# Patient Record
Sex: MALE | Race: WHITE | NOT HISPANIC OR LATINO
[De-identification: names, ages, dates, MRNs, and addresses within clinical notes are randomized per-mention and may not be internally consistent; named-entity substitution may affect disease eponyms.]

---

## 2018-10-16 ENCOUNTER — APPOINTMENT (OUTPATIENT)
Dept: SURGERY | Facility: CLINIC | Age: 48
End: 2018-10-16
Payer: COMMERCIAL

## 2018-10-16 VITALS
SYSTOLIC BLOOD PRESSURE: 110 MMHG | HEART RATE: 68 BPM | RESPIRATION RATE: 15 BRPM | HEIGHT: 70 IN | DIASTOLIC BLOOD PRESSURE: 69 MMHG | TEMPERATURE: 98.2 F | OXYGEN SATURATION: 97 % | WEIGHT: 200 LBS | BODY MASS INDEX: 28.63 KG/M2

## 2018-10-16 DIAGNOSIS — Z78.9 OTHER SPECIFIED HEALTH STATUS: ICD-10-CM

## 2018-10-16 DIAGNOSIS — Z80.1 FAMILY HISTORY OF MALIGNANT NEOPLASM OF TRACHEA, BRONCHUS AND LUNG: ICD-10-CM

## 2018-10-16 DIAGNOSIS — Z80.42 FAMILY HISTORY OF MALIGNANT NEOPLASM OF PROSTATE: ICD-10-CM

## 2018-10-16 DIAGNOSIS — Z80.0 FAMILY HISTORY OF MALIGNANT NEOPLASM OF DIGESTIVE ORGANS: ICD-10-CM

## 2018-10-16 DIAGNOSIS — Z80.8 FAMILY HISTORY OF MALIGNANT NEOPLASM OF OTHER ORGANS OR SYSTEMS: ICD-10-CM

## 2018-10-16 PROCEDURE — 99244 OFF/OP CNSLTJ NEW/EST MOD 40: CPT

## 2018-10-16 RX ORDER — FINASTERIDE 1 MG/1
1 TABLET ORAL
Refills: 0 | Status: ACTIVE | COMMUNITY

## 2018-10-18 ENCOUNTER — APPOINTMENT (OUTPATIENT)
Dept: SURGERY | Facility: CLINIC | Age: 48
End: 2018-10-18
Payer: COMMERCIAL

## 2018-10-18 VITALS
DIASTOLIC BLOOD PRESSURE: 69 MMHG | BODY MASS INDEX: 28.63 KG/M2 | HEIGHT: 70 IN | TEMPERATURE: 98.6 F | HEART RATE: 62 BPM | WEIGHT: 200 LBS | OXYGEN SATURATION: 98 % | SYSTOLIC BLOOD PRESSURE: 107 MMHG | RESPIRATION RATE: 15 BRPM

## 2018-10-18 PROCEDURE — 99215 OFFICE O/P EST HI 40 MIN: CPT

## 2018-10-18 RX ORDER — MULTIVITAMIN
TABLET ORAL
Refills: 0 | Status: ACTIVE | COMMUNITY

## 2018-10-18 RX ORDER — BIOTIN 10 MG
TABLET ORAL
Refills: 0 | Status: ACTIVE | COMMUNITY

## 2018-10-18 RX ORDER — OMEGA-3/DHA/EPA/FISH OIL 300-1000MG
CAPSULE ORAL
Refills: 0 | Status: ACTIVE | COMMUNITY

## 2018-10-18 RX ORDER — B-COMPLEX WITH VITAMIN C
TABLET ORAL
Refills: 0 | Status: ACTIVE | COMMUNITY

## 2018-10-18 RX ORDER — BETA-CAROTENE 10000 UNIT
CAPSULE ORAL
Refills: 0 | Status: ACTIVE | COMMUNITY

## 2018-10-20 ENCOUNTER — TRANSCRIPTION ENCOUNTER (OUTPATIENT)
Age: 48
End: 2018-10-20

## 2018-10-20 ENCOUNTER — FORM ENCOUNTER (OUTPATIENT)
Age: 48
End: 2018-10-20

## 2018-10-21 ENCOUNTER — APPOINTMENT (OUTPATIENT)
Dept: MRI IMAGING | Facility: CLINIC | Age: 48
End: 2018-10-21
Payer: COMMERCIAL

## 2018-10-21 ENCOUNTER — OUTPATIENT (OUTPATIENT)
Dept: OUTPATIENT SERVICES | Facility: HOSPITAL | Age: 48
LOS: 1 days | End: 2018-10-21
Payer: COMMERCIAL

## 2018-10-21 DIAGNOSIS — K80.20 CALCULUS OF GALLBLADDER WITHOUT CHOLECYSTITIS WITHOUT OBSTRUCTION: ICD-10-CM

## 2018-10-21 PROCEDURE — A9585: CPT

## 2018-10-21 PROCEDURE — 74183 MRI ABD W/O CNTR FLWD CNTR: CPT | Mod: 26

## 2018-10-21 PROCEDURE — 74183 MRI ABD W/O CNTR FLWD CNTR: CPT

## 2018-10-22 ENCOUNTER — OUTPATIENT (OUTPATIENT)
Dept: OUTPATIENT SERVICES | Facility: HOSPITAL | Age: 48
LOS: 1 days | End: 2018-10-22
Payer: COMMERCIAL

## 2018-10-22 VITALS
WEIGHT: 199.96 LBS | RESPIRATION RATE: 16 BRPM | TEMPERATURE: 99 F | HEIGHT: 70 IN | OXYGEN SATURATION: 98 % | HEART RATE: 66 BPM | DIASTOLIC BLOOD PRESSURE: 74 MMHG | SYSTOLIC BLOOD PRESSURE: 111 MMHG

## 2018-10-22 DIAGNOSIS — Z01.818 ENCOUNTER FOR OTHER PREPROCEDURAL EXAMINATION: ICD-10-CM

## 2018-10-22 DIAGNOSIS — Z98.890 OTHER SPECIFIED POSTPROCEDURAL STATES: Chronic | ICD-10-CM

## 2018-10-22 DIAGNOSIS — K80.20 CALCULUS OF GALLBLADDER WITHOUT CHOLECYSTITIS WITHOUT OBSTRUCTION: ICD-10-CM

## 2018-10-22 LAB
ALBUMIN SERPL ELPH-MCNC: 4.4 G/DL — SIGNIFICANT CHANGE UP (ref 3.3–5)
ALP SERPL-CCNC: 57 U/L — SIGNIFICANT CHANGE UP (ref 40–120)
ALT FLD-CCNC: 21 U/L — SIGNIFICANT CHANGE UP (ref 10–45)
ANION GAP SERPL CALC-SCNC: 9 MMOL/L — SIGNIFICANT CHANGE UP (ref 5–17)
AST SERPL-CCNC: 18 U/L — SIGNIFICANT CHANGE UP (ref 10–40)
BILIRUB SERPL-MCNC: 0.6 MG/DL — SIGNIFICANT CHANGE UP (ref 0.2–1.2)
BUN SERPL-MCNC: 9 MG/DL — SIGNIFICANT CHANGE UP (ref 7–23)
CALCIUM SERPL-MCNC: 9.1 MG/DL — SIGNIFICANT CHANGE UP (ref 8.4–10.5)
CHLORIDE SERPL-SCNC: 104 MMOL/L — SIGNIFICANT CHANGE UP (ref 96–108)
CO2 SERPL-SCNC: 28 MMOL/L — SIGNIFICANT CHANGE UP (ref 22–31)
CREAT SERPL-MCNC: 0.82 MG/DL — SIGNIFICANT CHANGE UP (ref 0.5–1.3)
GLUCOSE SERPL-MCNC: 113 MG/DL — HIGH (ref 70–99)
HCT VFR BLD CALC: 43.8 % — SIGNIFICANT CHANGE UP (ref 39–50)
HGB BLD-MCNC: 15.2 G/DL — SIGNIFICANT CHANGE UP (ref 13–17)
MCHC RBC-ENTMCNC: 31.1 PG — SIGNIFICANT CHANGE UP (ref 27–34)
MCHC RBC-ENTMCNC: 34.7 GM/DL — SIGNIFICANT CHANGE UP (ref 32–36)
MCV RBC AUTO: 89.8 FL — SIGNIFICANT CHANGE UP (ref 80–100)
PLATELET # BLD AUTO: 266 K/UL — SIGNIFICANT CHANGE UP (ref 150–400)
POTASSIUM SERPL-MCNC: 3.7 MMOL/L — SIGNIFICANT CHANGE UP (ref 3.5–5.3)
POTASSIUM SERPL-SCNC: 3.7 MMOL/L — SIGNIFICANT CHANGE UP (ref 3.5–5.3)
PROT SERPL-MCNC: 6.8 G/DL — SIGNIFICANT CHANGE UP (ref 6–8.3)
RBC # BLD: 4.88 M/UL — SIGNIFICANT CHANGE UP (ref 4.2–5.8)
RBC # FLD: 12.6 % — SIGNIFICANT CHANGE UP (ref 10.3–14.5)
SODIUM SERPL-SCNC: 141 MMOL/L — SIGNIFICANT CHANGE UP (ref 135–145)
WBC # BLD: 6.99 K/UL — SIGNIFICANT CHANGE UP (ref 3.8–10.5)
WBC # FLD AUTO: 6.99 K/UL — SIGNIFICANT CHANGE UP (ref 3.8–10.5)

## 2018-10-22 PROCEDURE — 85027 COMPLETE CBC AUTOMATED: CPT

## 2018-10-22 PROCEDURE — 80053 COMPREHEN METABOLIC PANEL: CPT

## 2018-10-22 PROCEDURE — G0463: CPT

## 2018-10-22 RX ORDER — VANCOMYCIN HCL 1 G
1500 VIAL (EA) INTRAVENOUS ONCE
Qty: 0 | Refills: 0 | Status: DISCONTINUED | OUTPATIENT
Start: 2018-10-24 | End: 2018-11-08

## 2018-10-22 RX ORDER — SODIUM CHLORIDE 9 MG/ML
3 INJECTION INTRAMUSCULAR; INTRAVENOUS; SUBCUTANEOUS EVERY 8 HOURS
Qty: 0 | Refills: 0 | Status: DISCONTINUED | OUTPATIENT
Start: 2018-10-24 | End: 2018-10-24

## 2018-10-22 RX ORDER — LIDOCAINE HCL 20 MG/ML
0.2 VIAL (ML) INJECTION ONCE
Qty: 0 | Refills: 0 | Status: DISCONTINUED | OUTPATIENT
Start: 2018-10-24 | End: 2018-10-24

## 2018-10-22 NOTE — H&P PST ADULT - PMH
Calculus of gallbladder without cholecystitis without obstruction Alopecia    Calculus of gallbladder without cholecystitis without obstruction    Sciatica, unspecified laterality

## 2018-10-22 NOTE — H&P PST ADULT - HISTORY OF PRESENT ILLNESS
49 yo male presenting with c/o two typical attacks of severe biliary colic 07/2018-10/10/2018. Pt had GI consult- s/p  abdominal CT scan/ MRCP revealed calculus of gall bladder. Pt presents to PST for pre op evaluation for laparoscopic cholecystectomy on 10/24/2018.

## 2018-10-23 ENCOUNTER — TRANSCRIPTION ENCOUNTER (OUTPATIENT)
Age: 48
End: 2018-10-23

## 2018-10-24 ENCOUNTER — OUTPATIENT (OUTPATIENT)
Dept: OUTPATIENT SERVICES | Facility: HOSPITAL | Age: 48
LOS: 1 days | End: 2018-10-24
Payer: COMMERCIAL

## 2018-10-24 ENCOUNTER — RESULT REVIEW (OUTPATIENT)
Age: 48
End: 2018-10-24

## 2018-10-24 ENCOUNTER — APPOINTMENT (OUTPATIENT)
Dept: SURGERY | Facility: HOSPITAL | Age: 48
End: 2018-10-24
Payer: COMMERCIAL

## 2018-10-24 VITALS
TEMPERATURE: 99 F | OXYGEN SATURATION: 100 % | HEART RATE: 60 BPM | WEIGHT: 199.96 LBS | HEIGHT: 70 IN | RESPIRATION RATE: 18 BRPM | SYSTOLIC BLOOD PRESSURE: 119 MMHG | DIASTOLIC BLOOD PRESSURE: 79 MMHG

## 2018-10-24 VITALS
OXYGEN SATURATION: 100 % | DIASTOLIC BLOOD PRESSURE: 76 MMHG | RESPIRATION RATE: 16 BRPM | HEART RATE: 72 BPM | TEMPERATURE: 100 F | SYSTOLIC BLOOD PRESSURE: 130 MMHG

## 2018-10-24 DIAGNOSIS — Z98.890 OTHER SPECIFIED POSTPROCEDURAL STATES: Chronic | ICD-10-CM

## 2018-10-24 DIAGNOSIS — K80.20 CALCULUS OF GALLBLADDER WITHOUT CHOLECYSTITIS WITHOUT OBSTRUCTION: ICD-10-CM

## 2018-10-24 PROCEDURE — 47562 LAPAROSCOPIC CHOLECYSTECTOMY: CPT

## 2018-10-24 PROCEDURE — 88304 TISSUE EXAM BY PATHOLOGIST: CPT

## 2018-10-24 PROCEDURE — 88304 TISSUE EXAM BY PATHOLOGIST: CPT | Mod: 26

## 2018-10-24 PROCEDURE — 47562 LAPAROSCOPIC CHOLECYSTECTOMY: CPT | Mod: 82

## 2018-10-24 RX ORDER — HYDROMORPHONE HYDROCHLORIDE 2 MG/ML
0.25 INJECTION INTRAMUSCULAR; INTRAVENOUS; SUBCUTANEOUS
Qty: 0 | Refills: 0 | Status: DISCONTINUED | OUTPATIENT
Start: 2018-10-24 | End: 2018-10-24

## 2018-10-24 RX ORDER — SODIUM CHLORIDE 9 MG/ML
1000 INJECTION, SOLUTION INTRAVENOUS
Qty: 0 | Refills: 0 | Status: DISCONTINUED | OUTPATIENT
Start: 2018-10-24 | End: 2018-11-08

## 2018-10-24 RX ORDER — ACETAMINOPHEN 500 MG
1000 TABLET ORAL ONCE
Qty: 0 | Refills: 0 | Status: COMPLETED | OUTPATIENT
Start: 2018-10-24 | End: 2018-10-24

## 2018-10-24 RX ORDER — FINASTERIDE 5 MG/1
1 TABLET, FILM COATED ORAL
Qty: 0 | Refills: 0 | COMMUNITY

## 2018-10-24 RX ORDER — ONDANSETRON 8 MG/1
4 TABLET, FILM COATED ORAL ONCE
Qty: 0 | Refills: 0 | Status: DISCONTINUED | OUTPATIENT
Start: 2018-10-24 | End: 2018-10-24

## 2018-10-24 RX ADMIN — HYDROMORPHONE HYDROCHLORIDE 0.25 MILLIGRAM(S): 2 INJECTION INTRAMUSCULAR; INTRAVENOUS; SUBCUTANEOUS at 10:59

## 2018-10-24 RX ADMIN — Medication 400 MILLIGRAM(S): at 10:29

## 2018-10-24 RX ADMIN — SODIUM CHLORIDE 75 MILLILITER(S): 9 INJECTION, SOLUTION INTRAVENOUS at 11:23

## 2018-10-24 RX ADMIN — HYDROMORPHONE HYDROCHLORIDE 0.25 MILLIGRAM(S): 2 INJECTION INTRAMUSCULAR; INTRAVENOUS; SUBCUTANEOUS at 10:29

## 2018-10-24 RX ADMIN — Medication 1000 MILLIGRAM(S): at 10:59

## 2018-10-24 NOTE — ASU DISCHARGE PLAN (ADULT/PEDIATRIC). - ITEMS TO FOLLOWUP WITH YOUR PHYSICIAN'S
Follow up with Dr. Lindo 1-2 weeks after surgery.  You may call his office to schedule an appointment.

## 2018-10-24 NOTE — BRIEF OPERATIVE NOTE - OPERATION/FINDINGS
Gallbladder dissected at base to achieve critical view of safety.  Cystic duct and artery clipped.  Gallbladder mobilized off the liver bed with minimal bleeding.  Ports removed under direct visualization.

## 2018-10-24 NOTE — BRIEF OPERATIVE NOTE - PROCEDURE
<<-----Click on this checkbox to enter Procedure Laparoscopic cholecystectomy  10/24/2018    Active  [id number]

## 2018-10-24 NOTE — ASU DISCHARGE PLAN (ADULT/PEDIATRIC). - MEDICATION SUMMARY - MEDICATIONS TO TAKE
I will START or STAY ON the medications listed below when I get home from the hospital:    finasteride 1 mg oral tablet  -- 1 tab(s) by mouth once a day  -- Indication: For BPH    oxyCODONE-acetaminophen 5 mg-325 mg oral tablet  -- 1 tab(s) by mouth every 6 hours MDD:6 tabs  -- Caution federal law prohibits the transfer of this drug to any person other  than the person for whom it was prescribed.  May cause drowsiness.  Alcohol may intensify this effect.  Use care when operating dangerous machinery.  This prescription cannot be refilled.  This product contains acetaminophen.  Do not use  with any other product containing acetaminophen to prevent possible liver damage.  Using more of this medication than prescribed may cause serious breathing problems.    -- Indication: For pain    Multi Vitamin+  -- 1  orally  -- Indication: For vitamin

## 2018-10-26 ENCOUNTER — APPOINTMENT (OUTPATIENT)
Dept: SURGERY | Facility: AMBULATORY MEDICAL SERVICES | Age: 48
End: 2018-10-26

## 2018-10-29 LAB — SURGICAL PATHOLOGY STUDY: SIGNIFICANT CHANGE UP

## 2018-11-06 ENCOUNTER — APPOINTMENT (OUTPATIENT)
Dept: SURGERY | Facility: CLINIC | Age: 48
End: 2018-11-06
Payer: COMMERCIAL

## 2018-11-06 VITALS
TEMPERATURE: 98 F | BODY MASS INDEX: 28.63 KG/M2 | RESPIRATION RATE: 16 BRPM | SYSTOLIC BLOOD PRESSURE: 101 MMHG | WEIGHT: 200 LBS | HEIGHT: 70 IN | OXYGEN SATURATION: 99 % | HEART RATE: 65 BPM | DIASTOLIC BLOOD PRESSURE: 66 MMHG

## 2018-11-06 DIAGNOSIS — K80.20 CALCULUS OF GALLBLADDER W/OUT CHOLECYSTITIS W/OUT OBSTRUCTION: ICD-10-CM

## 2018-11-06 PROCEDURE — 99024 POSTOP FOLLOW-UP VISIT: CPT

## 2019-05-16 PROBLEM — L65.9 NONSCARRING HAIR LOSS, UNSPECIFIED: Chronic | Status: ACTIVE | Noted: 2018-10-22

## 2019-05-16 PROBLEM — K80.20 CALCULUS OF GALLBLADDER WITHOUT CHOLECYSTITIS WITHOUT OBSTRUCTION: Chronic | Status: ACTIVE | Noted: 2018-10-22

## 2019-05-16 PROBLEM — M54.30 SCIATICA, UNSPECIFIED SIDE: Chronic | Status: ACTIVE | Noted: 2018-10-22

## 2019-06-11 ENCOUNTER — TRANSCRIPTION ENCOUNTER (OUTPATIENT)
Age: 49
End: 2019-06-11

## 2019-06-12 ENCOUNTER — APPOINTMENT (OUTPATIENT)
Dept: GASTROENTEROLOGY | Facility: CLINIC | Age: 49
End: 2019-06-12
Payer: COMMERCIAL

## 2019-06-12 VITALS
HEART RATE: 70 BPM | BODY MASS INDEX: 30.35 KG/M2 | TEMPERATURE: 98.7 F | SYSTOLIC BLOOD PRESSURE: 110 MMHG | HEIGHT: 70 IN | WEIGHT: 212 LBS | DIASTOLIC BLOOD PRESSURE: 80 MMHG | OXYGEN SATURATION: 99 %

## 2019-06-12 PROCEDURE — 99244 OFF/OP CNSLTJ NEW/EST MOD 40: CPT

## 2019-06-12 RX ORDER — PANTOPRAZOLE 40 MG/1
40 TABLET, DELAYED RELEASE ORAL DAILY
Qty: 1 | Refills: 1 | Status: ACTIVE | COMMUNITY
Start: 2019-06-12 | End: 1900-01-01

## 2019-06-12 NOTE — PHYSICAL EXAM
[General Appearance - Alert] : alert [General Appearance - In No Acute Distress] : in no acute distress [PERRL With Normal Accommodation] : pupils were equal in size, round, and reactive to light [Sclera] : the sclera and conjunctiva were normal [Extraocular Movements] : extraocular movements were intact [Outer Ear] : the ears and nose were normal in appearance [Oropharynx] : the oropharynx was normal [Neck Cervical Mass (___cm)] : no neck mass was observed [Jugular Venous Distention Increased] : there was no jugular-venous distention [Neck Appearance] : the appearance of the neck was normal [Thyroid Diffuse Enlargement] : the thyroid was not enlarged [Thyroid Nodule] : there were no palpable thyroid nodules [Heart Sounds] : normal S1 and S2 [Auscultation Breath Sounds / Voice Sounds] : lungs were clear to auscultation bilaterally [Heart Rate And Rhythm] : heart rate was normal and rhythm regular [Heart Sounds Gallop] : no gallops [Heart Sounds Pericardial Friction Rub] : no pericardial rub [Murmurs] : no murmurs [Soft, Nontender] : the abdomen was soft and nontender [Normal] : normal [Epigastric] : in the epigastric area [No Mass] : no masses were palpated [No HSM] : no hepatosplenomegaly noted [Cervical Lymph Nodes Enlarged Posterior Bilaterally] : posterior cervical [Cervical Lymph Nodes Enlarged Anterior Bilaterally] : anterior cervical [Femoral Lymph Nodes Enlarged Bilaterally] : femoral [Axillary Lymph Nodes Enlarged Bilaterally] : axillary [Supraclavicular Lymph Nodes Enlarged Bilaterally] : supraclavicular [Inguinal Lymph Nodes Enlarged Bilaterally] : inguinal [No CVA Tenderness] : no ~M costovertebral angle tenderness [No Spinal Tenderness] : no spinal tenderness [Nail Clubbing] : no clubbing  or cyanosis of the fingernails [Abnormal Walk] : normal gait [Motor Tone] : muscle strength and tone were normal [Skin Color & Pigmentation] : normal skin color and pigmentation [Musculoskeletal - Swelling] : no joint swelling seen [Skin Turgor] : normal skin turgor [] : no rash [Deep Tendon Reflexes (DTR)] : deep tendon reflexes were 2+ and symmetric [Sensation] : the sensory exam was normal to light touch and pinprick [No Focal Deficits] : no focal deficits [Oriented To Time, Place, And Person] : oriented to person, place, and time [Affect] : the affect was normal [Impaired Insight] : insight and judgment were intact

## 2019-06-12 NOTE — HISTORY OF PRESENT ILLNESS
[de-identified] : Post choly - still GERD - some bloat \par \par A low acid / reflux diet was discussed in great detail including  not smoking, not drinking alcohol, and not consuming foods that irritate the esophagus. It is helpful to eat small meals throughout the day instead of large meals. You should avoid eating before bedtime or lying down after you eat. It can be helpful to raise the head of your bed six inches. Additionally, you should maintain a healthy weight and good posture.. The patient was given written material to take home and review.\par \par  A low FODMAP diet was discussed with the patient at length. The patient had multiple questions all of which were answered. I recommended a nutritionist. Also recommended that the patient keep a food diary. We discussed  options such as Vegetables. Fresh fruits. Dairy that is lactose-free, and hard cheeses, or ripened/matured cheeses including... Beef, pork, chicken, fish, eggs. Avoid breadcrumbs, marinades, and sauces/gravies that may be high in FODMAPs. Soy products including tofu, tempeh. Grains.\par \par \par  The risks benefits alternatives and complications of the procedure/s were explained to the patient at length. The patient was agreeable and we will proceed.\par

## 2019-07-08 ENCOUNTER — APPOINTMENT (OUTPATIENT)
Dept: GASTROENTEROLOGY | Facility: AMBULATORY MEDICAL SERVICES | Age: 49
End: 2019-07-08
Payer: COMMERCIAL

## 2019-07-08 PROCEDURE — 43239 EGD BIOPSY SINGLE/MULTIPLE: CPT

## 2019-07-31 ENCOUNTER — APPOINTMENT (OUTPATIENT)
Dept: GASTROENTEROLOGY | Facility: CLINIC | Age: 49
End: 2019-07-31
Payer: COMMERCIAL

## 2019-07-31 VITALS
DIASTOLIC BLOOD PRESSURE: 75 MMHG | HEART RATE: 75 BPM | OXYGEN SATURATION: 98 % | BODY MASS INDEX: 30.92 KG/M2 | WEIGHT: 216 LBS | TEMPERATURE: 98.8 F | SYSTOLIC BLOOD PRESSURE: 120 MMHG | HEIGHT: 70 IN

## 2019-07-31 DIAGNOSIS — R10.9 UNSPECIFIED ABDOMINAL PAIN: ICD-10-CM

## 2019-07-31 DIAGNOSIS — Z09 ENCOUNTER FOR FOLLOW-UP EXAMINATION AFTER COMPLETED TREATMENT FOR CONDITIONS OTHER THAN MALIGNANT NEOPLASM: ICD-10-CM

## 2019-07-31 DIAGNOSIS — R11.0 NAUSEA: ICD-10-CM

## 2019-07-31 PROCEDURE — 99214 OFFICE O/P EST MOD 30 MIN: CPT

## 2019-07-31 NOTE — HISTORY OF PRESENT ILLNESS
[de-identified] : Clarker on PPI \par \par A low acid / reflux diet was discussed in great detail including  not smoking, not drinking alcohol, and not consuming foods that irritate the esophagus. It is helpful to eat small meals throughout the day instead of large meals. You should avoid eating before bedtime or lying down after you eat. It can be helpful to raise the head of your bed six inches. Additionally, you should maintain a healthy weight and good posture.. The patient was given written material to take home and review.\par

## 2019-07-31 NOTE — PHYSICAL EXAM
[General Appearance - Alert] : alert [Sclera] : the sclera and conjunctiva were normal [General Appearance - In No Acute Distress] : in no acute distress [Outer Ear] : the ears and nose were normal in appearance [Extraocular Movements] : extraocular movements were intact [PERRL With Normal Accommodation] : pupils were equal in size, round, and reactive to light [Oropharynx] : the oropharynx was normal [Neck Appearance] : the appearance of the neck was normal [Neck Cervical Mass (___cm)] : no neck mass was observed [Jugular Venous Distention Increased] : there was no jugular-venous distention [Thyroid Diffuse Enlargement] : the thyroid was not enlarged [Thyroid Nodule] : there were no palpable thyroid nodules [Auscultation Breath Sounds / Voice Sounds] : lungs were clear to auscultation bilaterally [Heart Sounds] : normal S1 and S2 [Heart Rate And Rhythm] : heart rate was normal and rhythm regular [Heart Sounds Gallop] : no gallops [Murmurs] : no murmurs [Heart Sounds Pericardial Friction Rub] : no pericardial rub [Normal] : normal [Soft, Nontender] : the abdomen was soft and nontender [Epigastric] : in the epigastric area [No Mass] : no masses were palpated [No HSM] : no hepatosplenomegaly noted [Cervical Lymph Nodes Enlarged Posterior Bilaterally] : posterior cervical [Cervical Lymph Nodes Enlarged Anterior Bilaterally] : anterior cervical [Supraclavicular Lymph Nodes Enlarged Bilaterally] : supraclavicular [Axillary Lymph Nodes Enlarged Bilaterally] : axillary [Femoral Lymph Nodes Enlarged Bilaterally] : femoral [Inguinal Lymph Nodes Enlarged Bilaterally] : inguinal [No CVA Tenderness] : no ~M costovertebral angle tenderness [No Spinal Tenderness] : no spinal tenderness [Abnormal Walk] : normal gait [Musculoskeletal - Swelling] : no joint swelling seen [Nail Clubbing] : no clubbing  or cyanosis of the fingernails [Skin Color & Pigmentation] : normal skin color and pigmentation [Motor Tone] : muscle strength and tone were normal [Skin Turgor] : normal skin turgor [] : no rash [Deep Tendon Reflexes (DTR)] : deep tendon reflexes were 2+ and symmetric [Sensation] : the sensory exam was normal to light touch and pinprick [No Focal Deficits] : no focal deficits [Oriented To Time, Place, And Person] : oriented to person, place, and time [Impaired Insight] : insight and judgment were intact [Affect] : the affect was normal

## 2019-09-22 ENCOUNTER — TRANSCRIPTION ENCOUNTER (OUTPATIENT)
Age: 49
End: 2019-09-22

## 2019-10-10 ENCOUNTER — TRANSCRIPTION ENCOUNTER (OUTPATIENT)
Age: 49
End: 2019-10-10

## 2020-11-17 ENCOUNTER — APPOINTMENT (OUTPATIENT)
Dept: PSYCHIATRY | Facility: CLINIC | Age: 50
End: 2020-11-17
Payer: COMMERCIAL

## 2020-11-17 DIAGNOSIS — M54.5 LOW BACK PAIN: ICD-10-CM

## 2020-11-17 DIAGNOSIS — I10 ESSENTIAL (PRIMARY) HYPERTENSION: ICD-10-CM

## 2020-11-17 PROCEDURE — 90791 PSYCH DIAGNOSTIC EVALUATION: CPT

## 2020-11-18 PROBLEM — I10 HYPERTENSION: Status: ACTIVE | Noted: 2020-11-18

## 2020-11-18 PROBLEM — M54.5 LOW BACK PAIN: Status: ACTIVE | Noted: 2020-11-18

## 2020-11-19 ENCOUNTER — APPOINTMENT (OUTPATIENT)
Dept: PSYCHIATRY | Facility: CLINIC | Age: 50
End: 2020-11-19
Payer: COMMERCIAL

## 2020-11-19 PROCEDURE — 99215 OFFICE O/P EST HI 40 MIN: CPT

## 2020-11-25 ENCOUNTER — APPOINTMENT (OUTPATIENT)
Dept: PSYCHIATRY | Facility: CLINIC | Age: 50
End: 2020-11-25

## 2020-12-08 ENCOUNTER — APPOINTMENT (OUTPATIENT)
Dept: PSYCHIATRY | Facility: CLINIC | Age: 50
End: 2020-12-08
Payer: COMMERCIAL

## 2020-12-08 PROCEDURE — 99214 OFFICE O/P EST MOD 30 MIN: CPT

## 2020-12-08 PROCEDURE — 99072 ADDL SUPL MATRL&STAF TM PHE: CPT

## 2020-12-08 PROCEDURE — 90834 PSYTX W PT 45 MINUTES: CPT

## 2020-12-15 ENCOUNTER — APPOINTMENT (OUTPATIENT)
Dept: PSYCHIATRY | Facility: CLINIC | Age: 50
End: 2020-12-15

## 2020-12-30 ENCOUNTER — APPOINTMENT (OUTPATIENT)
Dept: GASTROENTEROLOGY | Facility: CLINIC | Age: 50
End: 2020-12-30

## 2020-12-30 ENCOUNTER — APPOINTMENT (OUTPATIENT)
Dept: PSYCHIATRY | Facility: CLINIC | Age: 50
End: 2020-12-30
Payer: COMMERCIAL

## 2020-12-30 PROCEDURE — 99072 ADDL SUPL MATRL&STAF TM PHE: CPT

## 2020-12-30 PROCEDURE — 90834 PSYTX W PT 45 MINUTES: CPT

## 2021-01-05 ENCOUNTER — APPOINTMENT (OUTPATIENT)
Dept: PSYCHIATRY | Facility: CLINIC | Age: 51
End: 2021-01-05
Payer: COMMERCIAL

## 2021-01-05 PROCEDURE — 90834 PSYTX W PT 45 MINUTES: CPT

## 2021-01-12 ENCOUNTER — APPOINTMENT (OUTPATIENT)
Dept: PSYCHIATRY | Facility: CLINIC | Age: 51
End: 2021-01-12
Payer: COMMERCIAL

## 2021-01-12 PROCEDURE — 90834 PSYTX W PT 45 MINUTES: CPT

## 2021-01-20 ENCOUNTER — APPOINTMENT (OUTPATIENT)
Dept: PSYCHIATRY | Facility: CLINIC | Age: 51
End: 2021-01-20
Payer: COMMERCIAL

## 2021-01-20 PROCEDURE — 99072 ADDL SUPL MATRL&STAF TM PHE: CPT

## 2021-01-20 PROCEDURE — 90834 PSYTX W PT 45 MINUTES: CPT

## 2021-01-27 ENCOUNTER — APPOINTMENT (OUTPATIENT)
Dept: PSYCHIATRY | Facility: CLINIC | Age: 51
End: 2021-01-27

## 2021-02-11 ENCOUNTER — APPOINTMENT (OUTPATIENT)
Dept: PSYCHIATRY | Facility: CLINIC | Age: 51
End: 2021-02-11

## 2021-03-09 ENCOUNTER — APPOINTMENT (OUTPATIENT)
Dept: PSYCHIATRY | Facility: CLINIC | Age: 51
End: 2021-03-09
Payer: COMMERCIAL

## 2021-03-09 PROCEDURE — 99214 OFFICE O/P EST MOD 30 MIN: CPT

## 2021-04-28 ENCOUNTER — APPOINTMENT (OUTPATIENT)
Dept: PSYCHIATRY | Facility: CLINIC | Age: 51
End: 2021-04-28
Payer: COMMERCIAL

## 2021-04-28 PROCEDURE — 90834 PSYTX W PT 45 MINUTES: CPT

## 2021-04-28 PROCEDURE — 99072 ADDL SUPL MATRL&STAF TM PHE: CPT

## 2021-05-12 ENCOUNTER — APPOINTMENT (OUTPATIENT)
Dept: PSYCHIATRY | Facility: CLINIC | Age: 51
End: 2021-05-12
Payer: COMMERCIAL

## 2021-05-12 PROCEDURE — 90834 PSYTX W PT 45 MINUTES: CPT

## 2021-05-12 PROCEDURE — 99072 ADDL SUPL MATRL&STAF TM PHE: CPT

## 2021-06-08 ENCOUNTER — APPOINTMENT (OUTPATIENT)
Dept: PSYCHIATRY | Facility: CLINIC | Age: 51
End: 2021-06-08
Payer: COMMERCIAL

## 2021-06-08 PROCEDURE — 99072 ADDL SUPL MATRL&STAF TM PHE: CPT

## 2021-06-08 PROCEDURE — 90834 PSYTX W PT 45 MINUTES: CPT

## 2021-06-08 PROCEDURE — 99214 OFFICE O/P EST MOD 30 MIN: CPT

## 2021-07-06 ENCOUNTER — APPOINTMENT (OUTPATIENT)
Dept: PSYCHIATRY | Facility: CLINIC | Age: 51
End: 2021-07-06
Payer: COMMERCIAL

## 2021-07-06 PROCEDURE — 90834 PSYTX W PT 45 MINUTES: CPT

## 2021-07-06 PROCEDURE — 99072 ADDL SUPL MATRL&STAF TM PHE: CPT

## 2021-07-21 ENCOUNTER — APPOINTMENT (OUTPATIENT)
Dept: PSYCHIATRY | Facility: CLINIC | Age: 51
End: 2021-07-21
Payer: COMMERCIAL

## 2021-07-21 PROCEDURE — 90834 PSYTX W PT 45 MINUTES: CPT

## 2021-07-21 PROCEDURE — 99072 ADDL SUPL MATRL&STAF TM PHE: CPT

## 2021-08-04 ENCOUNTER — APPOINTMENT (OUTPATIENT)
Dept: PSYCHIATRY | Facility: CLINIC | Age: 51
End: 2021-08-04

## 2021-08-18 ENCOUNTER — APPOINTMENT (OUTPATIENT)
Dept: PSYCHIATRY | Facility: CLINIC | Age: 51
End: 2021-08-18
Payer: COMMERCIAL

## 2021-08-18 DIAGNOSIS — R45.89 OTHER SYMPTOMS AND SIGNS INVOLVING EMOTIONAL STATE: ICD-10-CM

## 2021-08-18 PROCEDURE — 90834 PSYTX W PT 45 MINUTES: CPT

## 2021-08-19 PROBLEM — R45.89 GUILTY FEELINGS: Status: ACTIVE | Noted: 2020-11-19

## 2021-09-22 ENCOUNTER — APPOINTMENT (OUTPATIENT)
Dept: PSYCHIATRY | Facility: CLINIC | Age: 51
End: 2021-09-22
Payer: COMMERCIAL

## 2021-09-22 PROCEDURE — 90834 PSYTX W PT 45 MINUTES: CPT

## 2021-10-13 ENCOUNTER — APPOINTMENT (OUTPATIENT)
Dept: PSYCHIATRY | Facility: CLINIC | Age: 51
End: 2021-10-13
Payer: COMMERCIAL

## 2021-10-13 PROCEDURE — 90834 PSYTX W PT 45 MINUTES: CPT

## 2021-10-27 ENCOUNTER — APPOINTMENT (OUTPATIENT)
Dept: PSYCHIATRY | Facility: CLINIC | Age: 51
End: 2021-10-27
Payer: COMMERCIAL

## 2021-10-27 PROCEDURE — 90834 PSYTX W PT 45 MINUTES: CPT

## 2021-11-10 ENCOUNTER — APPOINTMENT (OUTPATIENT)
Dept: PSYCHIATRY | Facility: CLINIC | Age: 51
End: 2021-11-10

## 2021-11-23 ENCOUNTER — APPOINTMENT (OUTPATIENT)
Dept: PSYCHIATRY | Facility: CLINIC | Age: 51
End: 2021-11-23
Payer: COMMERCIAL

## 2021-11-23 PROCEDURE — 90834 PSYTX W PT 45 MINUTES: CPT

## 2021-12-07 ENCOUNTER — APPOINTMENT (OUTPATIENT)
Dept: PSYCHIATRY | Facility: CLINIC | Age: 51
End: 2021-12-07
Payer: COMMERCIAL

## 2021-12-07 PROCEDURE — 99214 OFFICE O/P EST MOD 30 MIN: CPT

## 2021-12-10 ENCOUNTER — APPOINTMENT (OUTPATIENT)
Dept: PSYCHIATRY | Facility: CLINIC | Age: 51
End: 2021-12-10

## 2021-12-20 ENCOUNTER — APPOINTMENT (OUTPATIENT)
Dept: PSYCHIATRY | Facility: CLINIC | Age: 51
End: 2021-12-20
Payer: COMMERCIAL

## 2021-12-20 PROCEDURE — 90834 PSYTX W PT 45 MINUTES: CPT

## 2022-01-06 ENCOUNTER — APPOINTMENT (OUTPATIENT)
Dept: PSYCHIATRY | Facility: CLINIC | Age: 52
End: 2022-01-06
Payer: COMMERCIAL

## 2022-01-06 PROCEDURE — 90834 PSYTX W PT 45 MINUTES: CPT

## 2022-01-27 ENCOUNTER — APPOINTMENT (OUTPATIENT)
Dept: PSYCHIATRY | Facility: CLINIC | Age: 52
End: 2022-01-27
Payer: COMMERCIAL

## 2022-01-27 PROCEDURE — 90834 PSYTX W PT 45 MINUTES: CPT

## 2022-02-11 ENCOUNTER — APPOINTMENT (OUTPATIENT)
Dept: PSYCHIATRY | Facility: CLINIC | Age: 52
End: 2022-02-11
Payer: COMMERCIAL

## 2022-02-11 PROCEDURE — 90834 PSYTX W PT 45 MINUTES: CPT

## 2022-03-02 ENCOUNTER — APPOINTMENT (OUTPATIENT)
Dept: PSYCHIATRY | Facility: CLINIC | Age: 52
End: 2022-03-02
Payer: COMMERCIAL

## 2022-03-02 PROCEDURE — 90834 PSYTX W PT 45 MINUTES: CPT

## 2022-03-15 NOTE — H&P PST ADULT - HEMATOLOGY/LYMPHATICS
Occupational Therapy     Referred by: Toni Soriano MD; Medical Diagnosis (from order):    Diagnosis Information      Diagnosis    719.41 (ICD-9-CM) - M25.512 (ICD-10-CM) - Left shoulder pain    810.02 (ICD-9-CM) - S42.022A (ICD-10-CM) - Closed fracture of shaft of left clavicle                Daily Treatment Note    Visit:  2     SUBJECTIVE                                                                                                               OT session 2/5  \"I've been doing the exercises\"    OBJECTIVE                                                                                                                        TREATMENT                                                                                                                  Therapeutic Exercise:  TRX flex/extX 10, 10 sec hold  doorframe stretch X 10, 10 sec hold  Green tband ext/scap retraction X 10, 5 sec hold  UBE 2 for/rev      Manual Therapy:  STM to shoulder to decrease pain and soft tissue tightness.      Skilled input: verbal instruction/cues    Writer verbally educated and received verbal consent for hand placement, positioning of patient, and techniques to be performed today from patient     Home Exercise Program/Education Materials: Wall slides X 10, 10 sec hold  Corner stretch X 10, 10 sec hold  Active scap retraction X 10, 5 sec hold  Active shoulder ext x 10    Doorframe  Green tband rows/ext      Moist Heat (81189)  Location: L shoulder  Duration: 12  Results: decreased pain and tissue softening  Reaction: no adverse reaction to treatment      ASSESSMENT                                                                                                             Pt was seen for OT session with focus on pain management with IFC/MHP, decreasing soft tissue tightness with STM and therex to increase ROM and strength for improved ADL performance. Pt reports good follow through with HEP since initial eval.  Pt able to perform  upgraded stretching and light strengthening with green btand without significant increase in pain.  Pt issued exercises for home and demonstrates good return. Pt would benefit from cont OT to improve deficit areas and max I.    Patient Education:   Results of above outlined education: Verbalizes understanding and Demonstrates understanding      PLAN                                                                                                                           Suggestions for next session as indicated: Progress per plan of care         Therapy procedure time and total treatment time can be found documented on the Time Entry flowsheet   negative

## 2022-03-23 ENCOUNTER — APPOINTMENT (OUTPATIENT)
Dept: PSYCHIATRY | Facility: CLINIC | Age: 52
End: 2022-03-23
Payer: COMMERCIAL

## 2022-03-23 PROCEDURE — 90834 PSYTX W PT 45 MINUTES: CPT

## 2022-03-26 NOTE — H&P PST ADULT - NS HIV RISK FACTOR NO
Patient requests all Lab, Cardiology, and Radiology Results on their Discharge Instructions No, Declined

## 2022-04-20 ENCOUNTER — APPOINTMENT (OUTPATIENT)
Dept: PSYCHIATRY | Facility: CLINIC | Age: 52
End: 2022-04-20
Payer: COMMERCIAL

## 2022-04-20 PROCEDURE — 90834 PSYTX W PT 45 MINUTES: CPT

## 2022-05-25 ENCOUNTER — APPOINTMENT (OUTPATIENT)
Dept: PSYCHIATRY | Facility: CLINIC | Age: 52
End: 2022-05-25
Payer: COMMERCIAL

## 2022-05-25 PROCEDURE — 90834 PSYTX W PT 45 MINUTES: CPT

## 2022-06-08 ENCOUNTER — APPOINTMENT (OUTPATIENT)
Dept: PSYCHIATRY | Facility: CLINIC | Age: 52
End: 2022-06-08
Payer: COMMERCIAL

## 2022-06-08 PROCEDURE — 99214 OFFICE O/P EST MOD 30 MIN: CPT

## 2022-06-29 ENCOUNTER — APPOINTMENT (OUTPATIENT)
Dept: PSYCHIATRY | Facility: CLINIC | Age: 52
End: 2022-06-29

## 2022-06-29 PROCEDURE — 90834 PSYTX W PT 45 MINUTES: CPT

## 2022-07-20 ENCOUNTER — APPOINTMENT (OUTPATIENT)
Dept: PSYCHIATRY | Facility: CLINIC | Age: 52
End: 2022-07-20

## 2022-08-24 ENCOUNTER — APPOINTMENT (OUTPATIENT)
Dept: PSYCHIATRY | Facility: CLINIC | Age: 52
End: 2022-08-24

## 2022-10-31 ENCOUNTER — APPOINTMENT (OUTPATIENT)
Dept: PSYCHIATRY | Facility: CLINIC | Age: 52
End: 2022-10-31

## 2022-10-31 PROCEDURE — 90834 PSYTX W PT 45 MINUTES: CPT

## 2022-11-01 NOTE — BEHAVIORAL HEALTH
[Cognitive and/or Behavior Therapy] : Cognitive and/or Behavior Therapy  [[facility]] : [facility]  [FreeTextEntry2] : 1. Improved mood and self esteem\par 2. Improved anxiety and stress management.  [de-identified] : Patient returning for treatment after six months.  he reports that mood has been depressed with no major  changes in his current life circumstances. He reports that his mother has been declining and she is now 90 years old and he thinks about what will happen when she passes away and will he be able to make any changes to his situation or will he remain stuck as has been for several years. 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 He has been doing a little better with his son but he still feels guilty much of the time for not giving his wife a happy life. Patient was encouraged to attend session more regularly and remain active.

## 2022-11-01 NOTE — PLAN
[Recommended Frequency of Visits: ____] : Recommended frequency of visits: [unfilled] [Return in ____ week(s)] : Return in [unfilled] week(s)

## 2022-11-01 NOTE — PHYSICAL EXAM
[Cooperative] : cooperative [Depressed] : depressed [Full] : full [Clear] : clear [Linear/Goal Directed] : linear/goal directed [Average] : average [WNL] : within normal limits

## 2022-11-15 ENCOUNTER — APPOINTMENT (OUTPATIENT)
Dept: PSYCHIATRY | Facility: CLINIC | Age: 52
End: 2022-11-15

## 2022-11-15 PROCEDURE — 90834 PSYTX W PT 45 MINUTES: CPT

## 2022-11-16 NOTE — BEHAVIORAL HEALTH
[Cognitive and/or Behavior Therapy] : Cognitive and/or Behavior Therapy  [[facility]] : [facility]  [FreeTextEntry2] : 1. Improved mood and self esteem\par 2. Improved anxiety and stress management.  [de-identified] : Patient reports that mood remains depressed and anxious about the state of his life and feelings of guilt about having failed as a spouse and a father. He continues to have trouble making changes that he feels are necessary such as looking for a new area to live. He spoke about his mother's health issues and the negativity that she represents in his family's lives. Patient spoke about son's karate tournament that they attended and his attempts to teach son some life lessons.He has been doing a little better with son but he still feels worried about his future. 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

## 2022-11-28 ENCOUNTER — APPOINTMENT (OUTPATIENT)
Dept: PSYCHIATRY | Facility: CLINIC | Age: 52
End: 2022-11-28

## 2022-11-28 PROCEDURE — 90834 PSYTX W PT 45 MINUTES: CPT

## 2022-11-29 NOTE — BEHAVIORAL HEALTH
[Cognitive and/or Behavior Therapy] : Cognitive and/or Behavior Therapy  [[facility]] : [facility]  [FreeTextEntry2] : 1. Improved mood and self esteem\par 2. Improved anxiety and stress management.  [de-identified] : Patient reports that mood remains mostly depressed and anxious. He was able to do OK on the holiday and son's birthday and he is doing better at accepting son's differences from him. He spoke about upcoming Disability hearing and his ongoing worries about decisions he has made in the past that are affecting him now and in the future. 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

## 2022-12-07 ENCOUNTER — APPOINTMENT (OUTPATIENT)
Dept: PSYCHIATRY | Facility: CLINIC | Age: 52
End: 2022-12-07

## 2022-12-07 PROCEDURE — 99214 OFFICE O/P EST MOD 30 MIN: CPT

## 2022-12-07 NOTE — SOCIAL HISTORY
[FreeTextEntry1] : Patient grew up in [place].  He went to [employer] graduating in 1986.  High school was okay he was an average student he had a good group of friends he did not participate in any activities.  From 1986 until 1989 he tried to become a [employer].  His father then got from a job with the [employer] company he worked for [employer] from 1989 until he is retiring in December.  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  Then in 2014 he broke up the relationship with the band at his best friend.  He felt that there were too many politics behind the stage.  Patient was  in 2006.

## 2022-12-07 NOTE — PHYSICAL EXAM
[None] : none [Talkative] : not talkative [Obsessions] : no obsessions [Anxious] : no anxious [Dysphoric] : not dysphoric [Normal] : normal [Fair] : fair [FreeTextEntry8] : Subdued [FreeTextEntry9] : Mild dysphoria

## 2022-12-07 NOTE — FAMILY HISTORY
[FreeTextEntry1] : Patient born in [place].  Mother 88.  Father  of lung cancer.  He has a brother who  of a drug overdose he has 2 other brothers ages 67 and 65.  Patient denied any other psychiatric alcohol or drug history in the family.  No abuse history growing up.

## 2022-12-07 NOTE — PAST MEDICAL HISTORY
[FreeTextEntry1] : Patient saw psychiatrist about 25 years ago.  Patient does not remember what it was about he was never on any medication.

## 2022-12-07 NOTE — DISCUSSION/SUMMARY
[FreeTextEntry1] : 52-year-old male with depression panic guilt hypochondriasis.  Plan continue Remeron 30 mg Wellbutrin  mg.  Follow-up in 6 months.

## 2022-12-07 NOTE — HISTORY OF PRESENT ILLNESS
[FreeTextEntry1] : Still with low level depression.  Continues to worry about health feels that he deserves to be ill.  No emotional relationship with wife.  Takes care of son but does not feel connected to him.  Sorry that he retired from [employer].  Still delivering pizza. [de-identified] : Patient is a 50-year-old male, , retiring from [employer] December 5.  Patient lives at home with wife aged 46 who is a carpet  and Urdu.  He has a 10-year-old son by the marriage.  Patient states she has had multiple tests over the past couple of months because he is afraid that he is sick.  He thinks that he has something wrong with his lungs he thinks he might have pancreatic cancer he might have something else wrong with him.  All the work-ups have been negative.  He has been to the emergency room multiple times worked up and all the work-ups have been negative.  He has been told that all his symptoms are stress related takes Xanax he will get better.  Patient states his sleep is poor he has not eaten properly in months he is losing weight his stomach is upset.  He states he cries all day long.  He has a lot of symptoms to include shakiness tachycardia tightness of his chest shortness of breath GI upset emotionally he feels in a dark place she has no motion.  He is not sure about retiring but he felt it was time.  He is worried about making money and ends meet.  His wife is okay with him moving but they do not know where they are going to go.  He also is worried about his mother who is 88 and he goes to see her daily and is not sure what will happen but realizes he cannot make plans until she dies.  Relationship with the wife is strangely having had any physical relationship at about 10 years.  He states she loves his wife and son but his wife is always criticizing him he feels that she is very cold.  The patient has a lot of guilt because he got involved with a  woman and had a long-term relationship she eventually ended up dying of cervical cancer he feels guilty because she might of given her HPV.  The patient now goes to massage problems 1 time a week and feels guilty about that.  Patient is out on Worker's Comp. for an injured shoulder.  He feels very good guilty about his son his son is in a very good good school for gifted and talented children that have to take him to school because of the there is nothing in the neighborhood.  He wants his son to play musical instruments but is not interested.\par \par Patient gets up at about 7:00 in the morning he takes his son to the bus that he goes to physical therapy to the chiropractor in the afternoons and evenings he will watch television he has difficulty falling asleep and staying asleep.

## 2022-12-07 NOTE — CURRENT PSYCHIATRIC SYMPTOMS
[Depressed Mood] : no depressed mood [Anhedonia] : no anhedonia [Guilt] : not feeling guilty [Insomnia] : no insomnia disorder [Anorexia] : no anorexia [Excessive Worry] : no excessive worries [Restlessness] : no restlessness [Hypochondriasis] : no hypochondriasis [Panic] : no panic disorder [de-identified] : Mild dysphoria [de-identified] : Denied [de-identified] : Denied [de-identified] : Denied [de-identified] : None [de-identified] : None [de-identified] : None

## 2022-12-12 ENCOUNTER — APPOINTMENT (OUTPATIENT)
Dept: PSYCHIATRY | Facility: CLINIC | Age: 52
End: 2022-12-12

## 2022-12-12 PROCEDURE — 90834 PSYTX W PT 45 MINUTES: CPT

## 2022-12-13 NOTE — PHYSICAL EXAM
[Cooperative] : cooperative [Depressed] : depressed [Full] : full [Clear] : clear [Linear/Goal Directed] : linear/goal directed [Average] : average [WNL] : within normal limits 29-Sep-2017 21:14

## 2022-12-13 NOTE — BEHAVIORAL HEALTH
[Cognitive and/or Behavior Therapy] : Cognitive and/or Behavior Therapy  [[facility]] : [facility]  [FreeTextEntry2] : 1. Improved mood and self esteem\par 2. Improved anxiety and stress management.  [de-identified] : Patient reports that mood remains mostly depressed and anxious. He had Disability hearing and is cautiously optimistic that he will be approved. He spoke more about wife's depression an dhow it affects him and his son. 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 he is doing better with relationship with his son and is pleased that son is being more active playing basketball. He continues to feel guilt for not giving his wife a happier life and he is still having trouble making changes that he knows he needs to. Worked on finding more things to do to maintain some structure which helps mood and think about the future and things he can do to increase sense of satisfaction.

## 2022-12-28 ENCOUNTER — APPOINTMENT (OUTPATIENT)
Dept: PSYCHIATRY | Facility: CLINIC | Age: 52
End: 2022-12-28

## 2022-12-28 PROCEDURE — 90837 PSYTX W PT 60 MINUTES: CPT

## 2022-12-29 NOTE — PLAN
Spoke with patient via phone.   Last INR 2/13 was 1.6.  Dose increased per protocol.   Today's INR is 3.0 and is above goal range.    Current warfarin dosing verified with patient.  Patient was informed that their INR result was above therapeutic range and instructed to hold tonight and decrease current dose per protocol. Discussed return date for next INR.     Lois OLSON is in the office today supervising the treatment. Note forwarded to physician assistant for review.    Call your physician or seek medical care immediately if you notice any of the following symptoms of a bleed:   · Red, dark, coffee or cola colored urine  · Red or tar like stools  · Excessive bleeding from gums or nose  · Vomiting coffee colored or bright red material  · Coughing up red tinged sputum  · Severe or unprovoked pain (ex: severe Headache or Abdominal pain)  · Sudden, spontaneous bruising for no reason  · Excessive menstrual bleeding  · A cut that will not stop bleeding within 10-15 mins  · Symptoms associated with abnormal bleeding/high INR reviewed.    Encouraged to avoid activities that may result in a serious fall or injury and verbalizes understanding.    I agree with the above plan of care. Lois Calle PA-C     [Recommended Frequency of Visits: ____] : Recommended frequency of visits: [unfilled] [Return in ____ week(s)] : Return in [unfilled] week(s)

## 2022-12-29 NOTE — PHYSICAL EXAM
[Cooperative] : cooperative [Depressed] : depressed [Anxious] : anxious [Full] : full [Clear] : clear [Linear/Goal Directed] : linear/goal directed [Average] : average [WNL] : within normal limits

## 2022-12-29 NOTE — BEHAVIORAL HEALTH
[Cognitive and/or Behavior Therapy] : Cognitive and/or Behavior Therapy  [[facility]] : [facility]  [FreeTextEntry2] : 1. Improved mood and self esteem\par 2. Improved anxiety and stress management.  [de-identified] : Patient reports that mood is a little better with manageable anxiety at this time. He spoke about the holiday season which keeps him wife busy and distracted from negative thoughts for the most part. he is anticipating the next period where he will not be as busy and will have challenge of taking and picking up son from school as they no longer have bus option. He spoke about mother's health and attitude issues and how he and his brothers are dealing with this.    He continues to struggle with executing tasks that he has set for himself regarding selling his collectibles and states that he is likely to need an emergency situation to make any changes three. He is doing better with relationship with his son but still feels sadness and envy when he sees other dads sharing music with their sons when he wishes he had this with his son. Worked on finding more things to do to maintain some structure which helps mood and think about the future and things he can do to increase sense of satisfaction.

## 2023-01-20 ENCOUNTER — APPOINTMENT (OUTPATIENT)
Dept: PSYCHIATRY | Facility: CLINIC | Age: 53
End: 2023-01-20
Payer: COMMERCIAL

## 2023-01-20 PROCEDURE — 90834 PSYTX W PT 45 MINUTES: CPT

## 2023-01-23 NOTE — BEHAVIORAL HEALTH
[Cognitive and/or Behavior Therapy] : Cognitive and/or Behavior Therapy  [[facility]] : [facility]  [FreeTextEntry2] : 1. Improved mood and self esteem\par 2. Improved anxiety and stress management.  [de-identified] : Patient reports that mood and anxiety are a little worse at this time. He states that wife has become much more angry and depressed after holidays passed as she samson snot have these positive things to stay occupied with. Patient reports that he remains unable to make changes in area like organizing and cleaning or selling his collectibles.even as he acknowledges that it is getting closer to the time that they will have to move and vacate mother's house where he keeps much of his collection. We spoke about not making a decision as making one in effect and the likelihood that he will have to get rid of things en collin at that point. He continues to wait for result of Disability hearing and we spoke more about options that he will have to choose from at some point. He states that he and son are doing better these days and son has  been enjoying music more which makes him feel closer to him. Worked on finding more things to do to maintain some structure which helps mood and think about the future and things he can do to increase sense of satisfaction.

## 2023-01-25 ENCOUNTER — APPOINTMENT (OUTPATIENT)
Dept: PSYCHIATRY | Facility: CLINIC | Age: 53
End: 2023-01-25

## 2023-02-01 ENCOUNTER — APPOINTMENT (OUTPATIENT)
Dept: PSYCHIATRY | Facility: CLINIC | Age: 53
End: 2023-02-01
Payer: COMMERCIAL

## 2023-02-01 PROCEDURE — 90834 PSYTX W PT 45 MINUTES: CPT

## 2023-02-02 NOTE — BEHAVIORAL HEALTH
[Cognitive and/or Behavior Therapy] : Cognitive and/or Behavior Therapy  [[facility]] : [facility]  [FreeTextEntry2] : 1. Improved mood and self esteem\par 2. Improved anxiety and stress management.  [de-identified] : Patient reports that mood and anxiety are variable with some recent fears about his health again. Patient spoke about repeating unhealthy behaviors and sees it as punishing himself for not being a good enough  and father. He spoke about wife's anger and about son's video game habits. He continues to compare himself to other people who have better lives and we spoke about interrupting these patterns and refocusing on things that are healthier. He continues to wait for result of Disability hearing and we spoke more about options that he will have to choose from at some point. Worked on finding more things to do to maintain some structure which helps mood and think about the future and things he can do to increase sense of satisfaction.

## 2023-02-07 ENCOUNTER — APPOINTMENT (OUTPATIENT)
Dept: PSYCHIATRY | Facility: CLINIC | Age: 53
End: 2023-02-07

## 2023-02-15 ENCOUNTER — APPOINTMENT (OUTPATIENT)
Dept: PSYCHIATRY | Facility: CLINIC | Age: 53
End: 2023-02-15
Payer: COMMERCIAL

## 2023-02-15 PROCEDURE — 90834 PSYTX W PT 45 MINUTES: CPT

## 2023-02-16 NOTE — BEHAVIORAL HEALTH
[Cognitive and/or Behavior Therapy] : Cognitive and/or Behavior Therapy  [[facility]] : [facility]  [FreeTextEntry2] : 1. Improved mood and self esteem\par 2. Improved anxiety and stress management.  [de-identified] : Patient reports that mood and anxiety are variable. He had good news from doctor that he is well and he spoke about continuing to take some risks now that he knows he is OK. He spoke more about relationship with wife and not being able to make her happier. He is anticipating his mother dying soon and he is still not able to make some changes that he knows he will have to do at some point in the near future. He spoke about his concerns about sons school being overly liberal and what he is doing to challenge their curriculum. Worked on finding more things to do to maintain some structure which helps mood and think about the future and things he can do to increase sense of satisfaction.

## 2023-03-01 ENCOUNTER — APPOINTMENT (OUTPATIENT)
Dept: PSYCHIATRY | Facility: CLINIC | Age: 53
End: 2023-03-01
Payer: COMMERCIAL

## 2023-03-01 PROCEDURE — 90834 PSYTX W PT 45 MINUTES: CPT

## 2023-03-02 NOTE — BEHAVIORAL HEALTH
[Cognitive and/or Behavior Therapy] : Cognitive and/or Behavior Therapy  [[facility]] : [facility]  [FreeTextEntry2] : 1. Improved mood and self esteem\par 2. Improved anxiety and stress management.  [de-identified] : Patient reports that mood and anxiety are variable. He continues to focus on negative thoughts about himself and the future. He spoke about conversations with his wife about money and guilt he feels about ho their life has turned out. He is anticipating his mother dying soon and he is still not able to make some changes that he knows he will have to do at some point in the near future. Worked on finding more things to do to maintain some structure which helps mood and think about the future and things he can do to increase sense of satisfaction.

## 2023-03-05 ENCOUNTER — NON-APPOINTMENT (OUTPATIENT)
Age: 53
End: 2023-03-05

## 2023-03-15 ENCOUNTER — APPOINTMENT (OUTPATIENT)
Dept: PSYCHIATRY | Facility: CLINIC | Age: 53
End: 2023-03-15
Payer: COMMERCIAL

## 2023-03-15 PROCEDURE — 90834 PSYTX W PT 45 MINUTES: CPT

## 2023-03-16 NOTE — BEHAVIORAL HEALTH
[Cognitive and/or Behavior Therapy] : Cognitive and/or Behavior Therapy  [[facility]] : [facility]  [FreeTextEntry2] : 1. Improved mood and self esteem\par 2. Improved anxiety and stress management.  [de-identified] : Patient reports that mood and anxiety are variable. He continues to focus on negative thoughts about himself and the future. He spoke about mother being on chemotherapy and still not being able to take steps to prepare for when she is gone. He has plans with wife and son to travel for [place] and to see a friend and other plans coming up which help to distract him somewhat from negative thoughts.  Worked on finding more things to do to maintain some structure which helps mood and think about the future and things he can do to increase sense of satisfaction.

## 2023-03-29 ENCOUNTER — APPOINTMENT (OUTPATIENT)
Dept: PSYCHIATRY | Facility: CLINIC | Age: 53
End: 2023-03-29
Payer: COMMERCIAL

## 2023-03-29 PROCEDURE — 90834 PSYTX W PT 45 MINUTES: CPT

## 2023-03-30 NOTE — BEHAVIORAL HEALTH
[Cognitive and/or Behavior Therapy] : Cognitive and/or Behavior Therapy  [[facility]] : [facility]  [FreeTextEntry2] : 1. Improved mood and self esteem\par 2. Improved anxiety and stress management.  [de-identified] : Patient reports that mood and anxiety are variable. He spoke about some recent health issues with throat infection and pain from sciatica. He continues to have trouble not focusing on the negative and worries about worst case scenarios all the time. He states that he stopped taking his antidepressant because it was afraid it would interfere with steroid he is taking for infection. He was advised to resume taking this to prevent further anxiety and depression. Patient also spoke about some interaction with a friend that he had cut off and ambivalence about seeing him at an upcoming event. Worked on finding more things to do to maintain some structure which helps mood  and things he can do to increase sense of satisfaction.

## 2023-04-19 ENCOUNTER — APPOINTMENT (OUTPATIENT)
Dept: PSYCHIATRY | Facility: CLINIC | Age: 53
End: 2023-04-19
Payer: COMMERCIAL

## 2023-04-19 PROCEDURE — 90834 PSYTX W PT 45 MINUTES: CPT

## 2023-04-20 NOTE — BEHAVIORAL HEALTH
[Cognitive and/or Behavior Therapy] : Cognitive and/or Behavior Therapy  [[facility]] : [facility]  [FreeTextEntry2] : 1. Improved mood and self esteem\par 2. Improved anxiety and stress management.  [de-identified] : Patient reports that mood and anxiety are variable. He spoke more about recent health issues with throat infection and pain from sciatica. 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 He has resumed taking his medications. He spoke more about his mother's health and they were able to convince her to accept a 24 hour aide but he worries that she will reject her when she starts.         He continues to have trouble not focusing on the negative and worries about worst case scenarios all the time. Patient also spoke about financial worries and regrets that he retired as early ass he did. Worked on finding more things to do to maintain some structure which helps mood  and things he can do to increase sense of satisfaction.

## 2023-05-03 ENCOUNTER — APPOINTMENT (OUTPATIENT)
Dept: PSYCHIATRY | Facility: CLINIC | Age: 53
End: 2023-05-03
Payer: COMMERCIAL

## 2023-05-03 PROCEDURE — 90834 PSYTX W PT 45 MINUTES: CPT

## 2023-05-04 NOTE — PHYSICAL EXAM
[Cooperative] : cooperative [Depressed] : depressed [Anxious] : anxious [Angry] : angry [Full] : full [Clear] : clear [Linear/Goal Directed] : linear/goal directed [Average] : average [WNL] : within normal limits

## 2023-05-04 NOTE — BEHAVIORAL HEALTH
[Cognitive and/or Behavior Therapy] : Cognitive and/or Behavior Therapy  [[facility]] : [facility]  [FreeTextEntry2] : 1. Improved mood and self esteem\par 2. Improved anxiety and stress management.  [de-identified] : Patient reports that mood is more depressed  with increased anxiety and fears about his health. He states that he has not been engaging in any risky behaviors but mother had a fall and is in the hospital and not eating. He states that he is more anxious about the future and having to deal with things that he has been putting off for years. He spoke about going out of his way for a friend by finding him an apartment and then putting down some money for him and he is angry that friend not communicating with him and making him feel like he is being taken advantage of. Patient already has negative thoughts about himself and this is reinforcing them and we spoke about challenging these thoughts more actively and maintaining better boundaries to reduce anger and resentment. Worked on finding more things to do to maintain some structure which helps mood  and things he can do to increase sense of satisfaction.

## 2023-05-17 ENCOUNTER — APPOINTMENT (OUTPATIENT)
Dept: PSYCHIATRY | Facility: CLINIC | Age: 53
End: 2023-05-17

## 2023-05-24 ENCOUNTER — APPOINTMENT (OUTPATIENT)
Dept: PSYCHIATRY | Facility: CLINIC | Age: 53
End: 2023-05-24
Payer: COMMERCIAL

## 2023-05-24 PROCEDURE — 90834 PSYTX W PT 45 MINUTES: CPT

## 2023-05-25 NOTE — BEHAVIORAL HEALTH
[Cognitive and/or Behavior Therapy] : Cognitive and/or Behavior Therapy  [[facility]] : [facility]  [FreeTextEntry2] : 1. Improved mood and self esteem\par 2. Improved anxiety and stress management.  [de-identified] : Patient reports that mood is variable with some decreased anxiety as he went for medical check up and was reassured that he was OK. he states that mother is back in the hospital and they are facing difficult decisions about whether she can back home or needs to be in a nursing home as she is not cooperating with aides and not eating. He spoke about some conflict between his siblings about what to do. Patient spoke more about his anger at mother for being so negative and causing strife in the family. 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 Continued to work on challenging negative thoughts more actively and maintaining better boundaries to reduce anger and resentment. Worked on finding more things to do to maintain some structure which helps mood  and things he can do to increase sense of satisfaction.

## 2023-05-30 NOTE — H&P PST ADULT - HEIGHT IN INCHES
10 Oral Minoxidil Counseling- I discussed with the patient the risks of oral minoxidil including but not limited to shortness of breath, swelling of the feet or ankles, dizziness, lightheadedness, unwanted hair growth and allergic reaction.  The patient verbalized understanding of the proper use and possible adverse effects of oral minoxidil.  All of the patient's questions and concerns were addressed.

## 2023-06-07 ENCOUNTER — APPOINTMENT (OUTPATIENT)
Dept: PSYCHIATRY | Facility: CLINIC | Age: 53
End: 2023-06-07
Payer: MEDICARE

## 2023-06-07 DIAGNOSIS — F43.21 ADJUSTMENT DISORDER WITH DEPRESSED MOOD: ICD-10-CM

## 2023-06-07 PROCEDURE — 90834 PSYTX W PT 45 MINUTES: CPT

## 2023-06-07 PROCEDURE — 99214 OFFICE O/P EST MOD 30 MIN: CPT

## 2023-06-07 NOTE — CURRENT PSYCHIATRIC SYMPTOMS
[Depressed Mood] : no depressed mood [Anhedonia] : no anhedonia [Guilt] : not feeling guilty [Insomnia] : no insomnia disorder [Anorexia] : no anorexia [Excessive Worry] : no excessive worries [Restlessness] : no restlessness [Hypochondriasis] : no hypochondriasis [Panic] : no panic disorder [de-identified] : Mild dysphoria [de-identified] : Denied [de-identified] : Denied [de-identified] : Denied [de-identified] : None [de-identified] : None [de-identified] : None

## 2023-06-07 NOTE — HISTORY OF PRESENT ILLNESS
[FreeTextEntry1] : Son doing better.Mother 91 passed away having the weight today.  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  Thinking of moving unsure where to go because his wife is [place] and they would like to be around a larger city.  Health is stable [de-identified] : Patient is a 50-year-old male, , retiring from [employer] December 5.  Patient lives at home with wife aged 46 who is a carpet  and French.  He has a 10-year-old son by the marriage.  Patient states she has had multiple tests over the past couple of months because he is afraid that he is sick.  He thinks that he has something wrong with his lungs he thinks he might have pancreatic cancer he might have something else wrong with him.  All the work-ups have been negative.  He has been to the emergency room multiple times worked up and all the work-ups have been negative.  He has been told that all his symptoms are stress related takes Xanax he will get better.  Patient states his sleep is poor he has not eaten properly in months he is losing weight his stomach is upset.  He states he cries all day long.  He has a lot of symptoms to include shakiness tachycardia tightness of his chest shortness of breath GI upset emotionally he feels in a dark place she has no motion.  He is not sure about retiring but he felt it was time.  He is worried about making money and ends meet.  His wife is okay with him moving but they do not know where they are going to go.  He also is worried about his mother who is 88 and he goes to see her daily and is not sure what will happen but realizes he cannot make plans until she dies.  Relationship with the wife is strangely having had any physical relationship at about 10 years.  He states she loves his wife and son but his wife is always criticizing him he feels that she is very cold.  The patient has a lot of guilt because he got involved with a  woman and had a long-term relationship she eventually ended up dying of cervical cancer he feels guilty because she might of given her HPV.  The patient now goes to massage problems 1 time a week and feels guilty about that.  Patient is out on Worker's Comp. for an injured shoulder.  He feels very good guilty about his son his son is in a very good good school for gifted and talented children that have to take him to school because of the there is nothing in the neighborhood.  He wants his son to play musical instruments but is not interested.\par \par Patient gets up at about 7:00 in the morning he takes his son to the bus that he goes to physical therapy to the chiropractor in the afternoons and evenings he will watch television he has difficulty falling asleep and staying asleep.

## 2023-06-07 NOTE — FAMILY HISTORY
[FreeTextEntry1] : Patient born in [place].  Mother 88.  Father  of lung cancer.  He has a brother who  of a drug overdose he has 2 other brothers ages 67 and 65.  Patient denied any other psychiatric alcohol or drug history in the family.  No abuse history growing up.

## 2023-06-07 NOTE — SOCIAL HISTORY
[FreeTextEntry1] : Patient grew up in [place].  He went to [employer] graduating in 1986.  High school was okay he was an average student he had a good group of friends he did not participate in any activities.  From 1986 until 1989 he tried to become a [employer].  His father then got from a job with the [employer] company he worked for [employer] from 1989 until he is retiring in December.  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  Then in 2014 he broke up the relationship with the band at his best friend.  He felt that there were too many politics behind the stage.  Patient was  in 2006.

## 2023-06-08 PROBLEM — F43.21 GRIEF: Status: ACTIVE | Noted: 2020-11-19

## 2023-06-08 NOTE — BEHAVIORAL HEALTH
[Cognitive and/or Behavior Therapy] : Cognitive and/or Behavior Therapy  [[facility]] : [facility]  [FreeTextEntry2] : 1. Improved mood and self esteem\par 2. Improved anxiety and stress management.  [de-identified] : Patient reports that his mother passed away over the weekend. He reports some sadness but is coping well at this time. He spoke about some tension with brothers about what will happen next with the house. He states that he has been approved fro Disability which is a relief to him and he is less concerned about his finances now. He continues to report variable anxiety related to his health and still goes for frequent checkups for throat and mouth issues. Continued to work on challenging negative thoughts more actively and maintaining better boundaries to reduce anger and resentment. Worked on finding more things to do to maintain some structure which helps mood  and things he can do to increase sense of satisfaction.

## 2023-06-28 ENCOUNTER — APPOINTMENT (OUTPATIENT)
Dept: PSYCHIATRY | Facility: CLINIC | Age: 53
End: 2023-06-28

## 2023-07-12 ENCOUNTER — APPOINTMENT (OUTPATIENT)
Dept: PSYCHIATRY | Facility: CLINIC | Age: 53
End: 2023-07-12
Payer: COMMERCIAL

## 2023-07-12 PROCEDURE — 90834 PSYTX W PT 45 MINUTES: CPT

## 2023-08-09 ENCOUNTER — APPOINTMENT (OUTPATIENT)
Dept: PSYCHIATRY | Facility: CLINIC | Age: 53
End: 2023-08-09
Payer: COMMERCIAL

## 2023-08-09 PROCEDURE — 90834 PSYTX W PT 45 MINUTES: CPT

## 2023-08-10 NOTE — BEHAVIORAL HEALTH
[FreeTextEntry2] : 1. Improved mood and self esteem\par  2. Improved anxiety and stress management.  [Cognitive and/or Behavior Therapy] : Cognitive and/or Behavior Therapy  [[facility]] : [facility]  [de-identified] : Patient reports that mood and anxiety level remain variable, He continues to have health anxiety secondary to some risky behaviors that he engages in and seeks ought reassurance form his medical providers. 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  He continues to speak about guilt that he feels for how he has treated his wife but does not feel that he can change some of these behaviors.  Continued to work on challenging negative thoughts more actively and maintaining better boundaries to reduce anger and resentment. Worked on finding more things to do to maintain some structure which helps mood and things he can do to increase sense of satisfaction.

## 2023-08-30 ENCOUNTER — APPOINTMENT (OUTPATIENT)
Dept: PSYCHIATRY | Facility: CLINIC | Age: 53
End: 2023-08-30
Payer: MEDICARE

## 2023-08-30 PROCEDURE — 90834 PSYTX W PT 45 MINUTES: CPT

## 2023-08-31 NOTE — BEHAVIORAL HEALTH
[FreeTextEntry2] : 1. Improved mood and self esteem\par  2. Improved anxiety and stress management.  [Cognitive and/or Behavior Therapy] : Cognitive and/or Behavior Therapy  [[facility]] : [facility]  [de-identified] : Patient reports that mood and anxiety level remain variable, He states that health anxiety has been better after recent reassurance form his physician. 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  Patient spoke about his wife trying to start her own business with a couple colleagues. He is trying to be supportive of her but worries about the investment that he will have to make and her emotional state which affects him and his son. Patient spoke more about his mother's home and the negotiations with his siblings and cousin around what will happen next.  Continued to work on challenging negative thoughts more actively and maintaining better boundaries to reduce anger and resentment. Worked on finding more things to do to maintain some structure which helps mood and things he can do to increase sense of satisfaction.

## 2023-10-04 ENCOUNTER — APPOINTMENT (OUTPATIENT)
Dept: PSYCHIATRY | Facility: CLINIC | Age: 53
End: 2023-10-04

## 2023-11-01 ENCOUNTER — APPOINTMENT (OUTPATIENT)
Dept: PSYCHIATRY | Facility: CLINIC | Age: 53
End: 2023-11-01
Payer: MEDICARE

## 2023-11-01 PROCEDURE — 90834 PSYTX W PT 45 MINUTES: CPT

## 2023-12-06 ENCOUNTER — APPOINTMENT (OUTPATIENT)
Dept: PSYCHIATRY | Facility: CLINIC | Age: 53
End: 2023-12-06
Payer: MEDICARE

## 2023-12-06 PROCEDURE — 90834 PSYTX W PT 45 MINUTES: CPT

## 2023-12-29 ENCOUNTER — APPOINTMENT (OUTPATIENT)
Dept: PSYCHIATRY | Facility: CLINIC | Age: 53
End: 2023-12-29
Payer: COMMERCIAL

## 2023-12-29 PROCEDURE — 99214 OFFICE O/P EST MOD 30 MIN: CPT

## 2023-12-29 PROCEDURE — 90834 PSYTX W PT 45 MINUTES: CPT

## 2023-12-29 NOTE — FAMILY HISTORY
[FreeTextEntry1] : Patient born in [place].  Mother 88.  Father  of lung cancer.  He has a brother who  of a drug overdose he has 2 other brothers ages 67 and 65.  Patient denied any other psychiatric alcohol or drug history in the family.  No abuse history growing up.

## 2023-12-29 NOTE — CURRENT PSYCHIATRIC SYMPTOMS
[Depressed Mood] : no depressed mood [Anhedonia] : no anhedonia [Guilt] : not feeling guilty [Insomnia] : no insomnia disorder [Anorexia] : no anorexia [Excessive Worry] : no excessive worries [Restlessness] : no restlessness [Hypochondriasis] : no hypochondriasis [Panic] : no panic disorder [de-identified] : Mild dysphoria [de-identified] : Denied [de-identified] : Denied [de-identified] : Denied [de-identified] : None [de-identified] : None [de-identified] : None

## 2023-12-29 NOTE — DISCUSSION/SUMMARY
[FreeTextEntry1] : 53-year-old male with depression panic guilt hypochondriasis.  Plan continue Remeron 30 mg Wellbutrin  mg.  Patient suggested to increase therapy working on the goals of lifestyle changes.  Follow-up in 6 months.

## 2023-12-29 NOTE — SOCIAL HISTORY
[FreeTextEntry1] : Patient grew up in [place].  He went to [employer] graduating in 1986.  High school was okay he was an average student he had a good group of friends he did not participate in any activities.  From 1986 until 1989 he tried to become a [employer].  His father then got from a job with the [employer] company he worked for [employer] from 1989 until he is retiring in December.  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  Then in 2014 he broke up the relationship with the band at his best friend.  He felt that there were too many politics behind the stage.  Patient was  in 2006.

## 2023-12-29 NOTE — HISTORY OF PRESENT ILLNESS
[de-identified] : Patient is a 50-year-old male, , retiring from [employer] December 5.  Patient lives at home with wife aged 46 who is a carpet  and [place].  He has a 10-year-old son by the marriage.  Patient states she has had multiple tests over the past couple of months because he is afraid that he is sick.  He thinks that he has something wrong with his lungs he thinks he might have pancreatic cancer he might have something else wrong with him.  All the work-ups have been negative.  He has been to the emergency room multiple times worked up and all the work-ups have been negative.  He has been told that all his symptoms are stress related takes Xanax he will get better.  Patient states his sleep is poor he has not eaten properly in months he is losing weight his stomach is upset.  He states he cries all day long.  He has a lot of symptoms to include shakiness tachycardia tightness of his chest shortness of breath GI upset emotionally he feels in a dark place she has no motion.  He is not sure about retiring but he felt it was time.  He is worried about making money and ends meet.  His wife is okay with him moving but they do not know where they are going to go.  He also is worried about his mother who is 88 and he goes to see her daily and is not sure what will happen but realizes he cannot make plans until she dies.  Relationship with the wife is strangely having had any physical relationship at about 10 years.  He states she loves his wife and son but his wife is always criticizing him he feels that she is very cold.  The patient has a lot of guilt because he got involved with a  woman and had a long-term relationship she eventually ended up dying of cervical cancer he feels guilty because she might of given her HPV.  The patient now goes to massage problems 1 time a week and feels guilty about that.  Patient is out on Worker's Comp. for an injured shoulder.  He feels very good guilty about his son his son is in a very good good school for gifted and talented children that have to take him to school because of the there is nothing in the neighborhood.  He wants his son to play musical instruments but is not interested.\par  \par  Patient gets up at about 7:00 in the morning he takes his son to the bus that he goes to physical therapy to the chiropractor in the afternoons and evenings he will watch television he has difficulty falling asleep and staying asleep. [FreeTextEntry1] : Chronic unhappiness with life.  Reviewing decisions to retire early be on disability.  Unhappy with delivering pizza.  Unhappy with relationship at home.  Not sure of what he wants to do.  Has no goals no plans.

## 2024-01-02 NOTE — BEHAVIORAL HEALTH
[FreeTextEntry2] : 1. Improved mood and self esteem\par  2. Improved anxiety and stress management.  [Cognitive and/or Behavior Therapy] : Cognitive and/or Behavior Therapy  [[facility]] : [facility]  [de-identified] : Patient reports that mood and anxiety level remain down. He remains worried about his wife's new business venture and how it will affect their finances. He has his last concert of the year this week and we spoke about how he will be occupying himself afterwards and ways he can try to be more supportive of his wife despite his low risk tolerance. Continued to work on challenging negative thoughts more actively and maintaining better boundaries to reduce anger and resentment. Worked on finding more things to do to maintain some structure which helps mood and things he can do to increase sense of satisfaction.

## 2024-02-07 ENCOUNTER — APPOINTMENT (OUTPATIENT)
Dept: PSYCHIATRY | Facility: CLINIC | Age: 54
End: 2024-02-07
Payer: MEDICARE

## 2024-02-07 PROCEDURE — 90834 PSYTX W PT 45 MINUTES: CPT
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
## 2024-03-01 ENCOUNTER — APPOINTMENT (OUTPATIENT)
Dept: PSYCHIATRY | Facility: CLINIC | Age: 54
End: 2024-03-01
Payer: MEDICARE

## 2024-03-01 PROCEDURE — 90834 PSYTX W PT 45 MINUTES: CPT

## 2024-03-04 NOTE — BEHAVIORAL HEALTH
[FreeTextEntry2] : 1. Improved mood and self esteem\par  2. Improved anxiety and stress management.  [Cognitive and/or Behavior Therapy] : Cognitive and/or Behavior Therapy  [[facility]] : [facility]  [de-identified] : Patient reports that mood and anxiety level variable this period. He states that plans for him to buy out cousin's half of his house are progressing and he is feeling the financial pressure. His wife is still trying to get her business off the ground and he is anticipating having to pay for private high school for son. 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 He did have one night when he overdrank and spoke about the consequences of this and we spoke about the risks of this as he is on medications. Continued to work on challenging negative thoughts more actively and maintaining better boundaries to reduce anger and resentment. Worked on finding more things to do to maintain some structure which helps mood and things he can do to increase sense of satisfaction.

## 2024-03-20 ENCOUNTER — APPOINTMENT (OUTPATIENT)
Dept: PSYCHIATRY | Facility: CLINIC | Age: 54
End: 2024-03-20
Payer: MEDICARE

## 2024-03-20 PROCEDURE — 90834 PSYTX W PT 45 MINUTES: CPT

## 2024-03-21 NOTE — BEHAVIORAL HEALTH
[Cognitive and/or Behavior Therapy] : Cognitive and/or Behavior Therapy  [FreeTextEntry2] : 1. Improved mood and self esteem\par  2. Improved anxiety and stress management.  [[facility]] : [facility]  [de-identified] : Patient reports that mood and anxiety level variable this period. He presents with some increased irritability and agitation and at times is walking around the office. He spoke about anxiety about finances as son is likely going to [employer] school and wife's business is not getting off the ground. He spoke about relationships with some of the people he has known from [employer] events over the years and conflicts that have arisen from this. He states that he is a "bad person" and he worries that he will have something bad happen to him at some point because of bad things he has done. Continued to work on challenging negative thoughts more actively and maintaining better boundaries to reduce anger and resentment. Worked on finding more things to do to maintain some structure which helps mood and things he can do to increase sense of satisfaction.

## 2024-03-21 NOTE — PHYSICAL EXAM
[Cooperative] : cooperative [Depressed] : depressed [Anxious] : anxious [Full] : full [Irritable] : irritability [Clear] : clear [Linear/Goal Directed] : linear/goal directed [None Reported] : none reported [Self-Deprecatory] : self-deprecatory [Average] : average [WNL] : within normal limits

## 2024-04-17 ENCOUNTER — APPOINTMENT (OUTPATIENT)
Dept: PSYCHIATRY | Facility: CLINIC | Age: 54
End: 2024-04-17
Payer: MEDICARE

## 2024-04-17 PROCEDURE — 90834 PSYTX W PT 45 MINUTES: CPT

## 2024-04-18 NOTE — PHYSICAL EXAM
[Cooperative] : cooperative [Depressed] : depressed [Full] : full [Anxious] : anxious [Irritable] : irritability [Clear] : clear [Linear/Goal Directed] : linear/goal directed [Self-Deprecatory] : self-deprecatory [None Reported] : none reported [Average] : average [WNL] : within normal limits

## 2024-04-18 NOTE — BEHAVIORAL HEALTH
[FreeTextEntry2] : 1. Improved mood and self esteem\par  2. Improved anxiety and stress management.  [Cognitive and/or Behavior Therapy] : Cognitive and/or Behavior Therapy  [[facility]] : [facility]  [de-identified] : Patient reports that mood and anxiety level worse this period. 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 He spoke about some interactions with people that he has known form the past and the challenge of navigating these relationships in addition to stresses he is dealing with at home. He is restless and agitated at times during session and has to get up and walk around. Continued to work on challenging negative thoughts more actively and maintaining better boundaries to reduce anger and resentment. Worked on finding more things to do to maintain some structure which helps mood and things he can do to increase sense of satisfaction.

## 2024-05-22 ENCOUNTER — APPOINTMENT (OUTPATIENT)
Dept: PSYCHIATRY | Facility: CLINIC | Age: 54
End: 2024-05-22
Payer: MEDICARE

## 2024-05-22 PROCEDURE — 90834 PSYTX W PT 45 MINUTES: CPT

## 2024-05-23 NOTE — BEHAVIORAL HEALTH
[FreeTextEntry2] : 1. Improved mood and self esteem\par  2. Improved anxiety and stress management.  [Cognitive and/or Behavior Therapy] : Cognitive and/or Behavior Therapy  [[facility]] : [facility]  [de-identified] : Patient reports that mood and anxiety level a little better. He states that his wife has ended business partnership and has applied for paid position that will help with family income. He spoke more about some interactions with people that he has known from the past and the challenge of navigating these relationships in addition to stresses he is dealing with at home. He is less restless during session and was able to stay seated throughout the appointment. Continued to work on challenging negative thoughts more actively and maintaining better boundaries to reduce anger and resentment. Worked on finding more things to do to maintain some structure which helps mood and things he can do to increase sense of satisfaction.

## 2024-05-23 NOTE — PHYSICAL EXAM
[Cooperative] : cooperative [Depressed] : depressed [Anxious] : anxious [Full] : full [Irritable] : irritability [Clear] : clear [Linear/Goal Directed] : linear/goal directed [Self-Deprecatory] : self-deprecatory [None Reported] : none reported [Average] : average [WNL] : within normal limits

## 2024-06-19 ENCOUNTER — APPOINTMENT (OUTPATIENT)
Dept: PSYCHIATRY | Facility: CLINIC | Age: 54
End: 2024-06-19
Payer: MEDICARE

## 2024-06-19 DIAGNOSIS — G47.00 INSOMNIA, UNSPECIFIED: ICD-10-CM

## 2024-06-19 DIAGNOSIS — F32.A DEPRESSION, UNSPECIFIED: ICD-10-CM

## 2024-06-19 DIAGNOSIS — F41.9 ANXIETY DISORDER, UNSPECIFIED: ICD-10-CM

## 2024-06-19 DIAGNOSIS — F45.21 HYPOCHONDRIASIS: ICD-10-CM

## 2024-06-19 PROCEDURE — 90834 PSYTX W PT 45 MINUTES: CPT

## 2024-06-19 PROCEDURE — 99204 OFFICE O/P NEW MOD 45 MIN: CPT

## 2024-06-19 RX ORDER — MIRTAZAPINE 30 MG/1
30 TABLET, FILM COATED ORAL
Qty: 90 | Refills: 1 | Status: ACTIVE | COMMUNITY
Start: 2020-11-19 | End: 1900-01-01

## 2024-06-19 RX ORDER — BUPROPION HYDROCHLORIDE 300 MG/1
300 TABLET, EXTENDED RELEASE ORAL
Qty: 90 | Refills: 1 | Status: ACTIVE | COMMUNITY
Start: 2021-12-07 | End: 1900-01-01

## 2024-06-19 NOTE — FAMILY HISTORY
[FreeTextEntry1] : Patient born in [place].  Mother 88.  Father  of lung cancer.  He has a brother who  of a drug overdose he has 2 other brothers ages 67 and 65.  Patient denied any other psychiatric alcohol or drug history in the family.  No abuse history growing up.

## 2024-06-19 NOTE — CURRENT PSYCHIATRIC SYMPTOMS
[Depressed Mood] : no depressed mood [Anhedonia] : no anhedonia [Guilt] : not feeling guilty [Insomnia] : no insomnia disorder [Anorexia] : no anorexia [Excessive Worry] : no excessive worries [Restlessness] : no restlessness [Hypochondriasis] : no hypochondriasis [Panic] : no panic disorder [de-identified] : Mild dysphoria [de-identified] : Denied [de-identified] : Denied [de-identified] : Denied [de-identified] : None [de-identified] : None [de-identified] : None
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
## 2024-06-19 NOTE — DISCUSSION/SUMMARY
[FreeTextEntry1] : 53-year-old male with depression panic guilt hypochondriasis.  Plan continue Remeron 30 mg Wellbutrin  mg.  Clinician.

## 2024-06-19 NOTE — SOCIAL HISTORY
[FreeTextEntry1] : Patient grew up in [place].  He went to [employer] graduating in 1986.  High school was okay he was an average student he had a good group of friends he did not participate in any activities.  From 1986 until 1989 he tried to become a Neg[employer].  His father then got from a job with the [employer] company he worked for [employer] from 1989 until he is retiring in December.  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  Then in 2014 he broke up the relationship with the band at his best friend.  He felt that there were too many politics behind the stage.  Patient was  in 2006.

## 2024-06-20 PROBLEM — F41.9 ANXIETY: Status: ACTIVE | Noted: 2020-11-18

## 2024-06-20 PROBLEM — F32.A DEPRESSION: Status: ACTIVE | Noted: 2020-11-18

## 2024-06-20 NOTE — BEHAVIORAL HEALTH
[FreeTextEntry2] : 1. Improved mood and self esteem\par  2. Improved anxiety and stress management.  [Cognitive and/or Behavior Therapy] : Cognitive and/or Behavior Therapy  [[facility]] : [facility]  [de-identified] : Patient reports that mood and anxiety level a little better. He states that his wife has started new job which will help with finances. he has been busy with musical gigs, and he enjoys this but had to deal with pulled muscle in his leg which was painful. He states that mood still depressed in between activities, and he still misses relationship he had in the past as things are still not good with wife. He spoke more about some interactions with people that he has known from the past and the challenge of navigating these relationships in addition to stresses he is dealing with at home. Continued to work on challenging negative thoughts more actively and maintaining better boundaries to reduce anger and resentment. Worked on finding more things to do to maintain some structure which helps mood and things he can do to increase sense of satisfaction.

## 2024-07-10 ENCOUNTER — APPOINTMENT (OUTPATIENT)
Dept: PSYCHIATRY | Facility: CLINIC | Age: 54
End: 2024-07-10
Payer: MEDICARE

## 2024-07-10 DIAGNOSIS — F32.A DEPRESSION, UNSPECIFIED: ICD-10-CM

## 2024-07-10 DIAGNOSIS — F41.9 ANXIETY DISORDER, UNSPECIFIED: ICD-10-CM

## 2024-07-10 PROCEDURE — 90834 PSYTX W PT 45 MINUTES: CPT

## 2024-08-07 ENCOUNTER — APPOINTMENT (OUTPATIENT)
Dept: PSYCHIATRY | Facility: CLINIC | Age: 54
End: 2024-08-07

## 2024-08-07 PROCEDURE — 90834 PSYTX W PT 45 MINUTES: CPT

## 2024-08-08 NOTE — PHYSICAL EXAM
[Cooperative] : cooperative [Depressed] : depressed [Anxious] : anxious [Full] : full [Clear] : clear [Linear/Goal Directed] : linear/goal directed [Self-Deprecatory] : self-deprecatory [None Reported] : none reported [Average] : average [WNL] : within normal limits

## 2024-08-08 NOTE — BEHAVIORAL HEALTH
[Cognitive and/or Behavior Therapy] : Cognitive and/or Behavior Therapy  [[facility]] : [facility]  [FreeTextEntry2] : 1. Improved mood and self esteem\par  2. Improved anxiety and stress management.  [de-identified] : Patient reports that anxiety level is variable with some high stress related to having to buy out his brother's half of his home next week. 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 He spoke about managing with financial changes and the approaching start of son's school year at private school. He is doing better at not venting to wife and tolerating her spending as she has started new job. He is looking for new psychiatrist to continue with his medications. Continued to work on challenging negative thoughts more actively and maintaining better boundaries to reduce anger and resentment. Worked on finding more things to do to maintain some structure which helps mood and things he can do to increase sense of satisfaction.

## 2024-08-28 ENCOUNTER — APPOINTMENT (OUTPATIENT)
Dept: PSYCHIATRY | Facility: CLINIC | Age: 54
End: 2024-08-28

## 2024-09-18 ENCOUNTER — APPOINTMENT (OUTPATIENT)
Dept: PSYCHIATRY | Facility: CLINIC | Age: 54
End: 2024-09-18
Payer: MEDICARE

## 2024-09-18 DIAGNOSIS — F32.A DEPRESSION, UNSPECIFIED: ICD-10-CM

## 2024-09-18 DIAGNOSIS — F41.9 ANXIETY DISORDER, UNSPECIFIED: ICD-10-CM

## 2024-09-18 PROCEDURE — 90834 PSYTX W PT 45 MINUTES: CPT

## 2024-09-19 NOTE — BEHAVIORAL HEALTH
[Cognitive and/or Behavior Therapy] : Cognitive and/or Behavior Therapy  [[facility]] : [facility]  [FreeTextEntry2] : 1. Improved mood and self esteem\par  2. Improved anxiety and stress management.  [de-identified] : Patient reports that anxiety level and mood are down. He stated that he did buy his home outright but does not like having less money in his savings. He also worries about the toll that this process has taken on his family. 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 He is also expressing sadness about his relationships situation and wonders if things will change in the future and how to navigate this.  Continued to work on challenging negative thoughts more actively and maintaining better boundaries to reduce anger and resentment. Worked on finding more things to do to maintain some structure which helps mood and things he can do to increase sense of satisfaction.

## 2024-10-15 NOTE — H&P PST ADULT - GIT GEN HX ROS MEA POS PC
(11/22/2023)    Housing Stability Vital Sign     Unstable Housing in the Last Year: No        SURGICAL HISTORY  Past Surgical History:   Procedure Laterality Date    CHOLECYSTECTOMY, LAPAROSCOPIC      CIRCUMCISION      LUMBAR LAMINECTOMY      SHOULDER SURGERY      SPINE SURGERY  03/29/2017    Stimulator-Bivarustronic restore ultra surescan [id number] conditonal    UMBILICAL HERNIA REPAIR      UPPER GASTROINTESTINAL ENDOSCOPY N/A 10/18/2023    EGD BIOPSY OF THE DUODENUM FOR CELIAC, THE STOMACH FOR H-PYLORI, AND THE ESOPHAGUS FOR EOSINOPHILIC ESOPHAGITIS performed by Madelaine Blanc MD at [facility] ENDOSCOPY    VASECTOMY                   CURRENT MEDICATIONS  Current Outpatient Medications   Medication Sig Dispense Refill    ARIPiprazole (ABILIFY) 5 MG tablet Take 1 tablet by mouth daily 30 tablet 0    sildenafil (VIAGRA) 100 MG tablet Take 1 tablet by mouth daily as needed for Erectile Dysfunction 30 tablet 2    DULoxetine (CYMBALTA) 30 MG extended release capsule TAKE 2 CAPSULES BY MOUTH 2 TIMES A  capsule 0    gabapentin (NEURONTIN) 100 MG capsule Take 2 capsules two times daily. 120 capsule 2    omeprazole (PRILOSEC) 20 MG delayed release capsule Take 1 capsule by mouth 2 times daily 180 capsule 1    losartan (COZAAR) 50 MG tablet Take 1 tablet by mouth daily 90 tablet 1    atorvastatin (LIPITOR) 20 MG tablet TAKE ONE (1) TABLET BY MOUTH EACH NIGHT AT BEDTIME 90 tablet 3    loratadine (CLARITIN) 10 MG tablet Take 1 tablet by mouth daily 90 tablet 0    carvedilol (COREG) 12.5 MG tablet Take 1 tablet by mouth 2 times daily      tiZANidine (ZANAFLEX) 4 MG tablet TAKE 1 TABLET BY MOUTH 3 TIMES A DAY AS NEEDED FOR SMASMS      ipratropium-albuterol (DUONEB) 0.5-2.5 (3) MG/3ML SOLN nebulizer solution Inhale 3 mLs into the lungs every 4 hours as needed for Shortness of Breath 360 mL 1     No current facility-administered medications for this visit.       ALLERGIES  Allergies   Allergen Reactions    Latex Other (See Comments)     see HPI/abdominal pain

## 2024-10-16 ENCOUNTER — APPOINTMENT (OUTPATIENT)
Dept: PSYCHIATRY | Facility: CLINIC | Age: 54
End: 2024-10-16
Payer: MEDICARE

## 2024-10-16 DIAGNOSIS — F32.A DEPRESSION, UNSPECIFIED: ICD-10-CM

## 2024-10-16 DIAGNOSIS — F41.9 ANXIETY DISORDER, UNSPECIFIED: ICD-10-CM

## 2024-10-16 PROCEDURE — 90834 PSYTX W PT 45 MINUTES: CPT

## 2024-11-06 ENCOUNTER — APPOINTMENT (OUTPATIENT)
Dept: PSYCHIATRY | Facility: CLINIC | Age: 54
End: 2024-11-06
Payer: MEDICARE

## 2024-11-06 DIAGNOSIS — F41.9 ANXIETY DISORDER, UNSPECIFIED: ICD-10-CM

## 2024-11-06 PROCEDURE — 90834 PSYTX W PT 45 MINUTES: CPT

## 2024-11-27 ENCOUNTER — APPOINTMENT (OUTPATIENT)
Dept: PSYCHIATRY | Facility: CLINIC | Age: 54
End: 2024-11-27
Payer: MEDICARE

## 2024-11-27 DIAGNOSIS — F32.A DEPRESSION, UNSPECIFIED: ICD-10-CM

## 2024-11-27 DIAGNOSIS — F41.9 ANXIETY DISORDER, UNSPECIFIED: ICD-10-CM

## 2024-11-27 PROCEDURE — 90834 PSYTX W PT 45 MINUTES: CPT

## 2024-12-18 ENCOUNTER — APPOINTMENT (OUTPATIENT)
Dept: PSYCHIATRY | Facility: CLINIC | Age: 54
End: 2024-12-18
Payer: MEDICARE

## 2024-12-18 DIAGNOSIS — F32.A DEPRESSION, UNSPECIFIED: ICD-10-CM

## 2024-12-18 DIAGNOSIS — G47.00 INSOMNIA, UNSPECIFIED: ICD-10-CM

## 2024-12-18 DIAGNOSIS — F41.9 ANXIETY DISORDER, UNSPECIFIED: ICD-10-CM

## 2024-12-18 PROCEDURE — 90834 PSYTX W PT 45 MINUTES: CPT

## 2025-01-15 ENCOUNTER — APPOINTMENT (OUTPATIENT)
Dept: PSYCHIATRY | Facility: CLINIC | Age: 55
End: 2025-01-15

## 2025-02-05 ENCOUNTER — APPOINTMENT (OUTPATIENT)
Dept: PSYCHIATRY | Facility: CLINIC | Age: 55
End: 2025-02-05
Payer: MEDICARE

## 2025-02-05 DIAGNOSIS — F41.9 ANXIETY DISORDER, UNSPECIFIED: ICD-10-CM

## 2025-02-05 DIAGNOSIS — F32.A DEPRESSION, UNSPECIFIED: ICD-10-CM

## 2025-02-05 PROCEDURE — 90834 PSYTX W PT 45 MINUTES: CPT

## 2025-03-12 ENCOUNTER — APPOINTMENT (OUTPATIENT)
Dept: PSYCHIATRY | Facility: CLINIC | Age: 55
End: 2025-03-12
Payer: MEDICARE

## 2025-03-12 DIAGNOSIS — F41.9 ANXIETY DISORDER, UNSPECIFIED: ICD-10-CM

## 2025-03-12 DIAGNOSIS — F32.A DEPRESSION, UNSPECIFIED: ICD-10-CM

## 2025-03-12 PROCEDURE — 90834 PSYTX W PT 45 MINUTES: CPT

## 2025-04-09 ENCOUNTER — APPOINTMENT (OUTPATIENT)
Dept: PSYCHIATRY | Facility: CLINIC | Age: 55
End: 2025-04-09
Payer: MEDICARE

## 2025-04-09 DIAGNOSIS — F41.9 ANXIETY DISORDER, UNSPECIFIED: ICD-10-CM

## 2025-04-09 DIAGNOSIS — F32.A DEPRESSION, UNSPECIFIED: ICD-10-CM

## 2025-04-09 PROCEDURE — 90834 PSYTX W PT 45 MINUTES: CPT

## 2025-05-07 ENCOUNTER — APPOINTMENT (OUTPATIENT)
Dept: PSYCHIATRY | Facility: CLINIC | Age: 55
End: 2025-05-07

## 2025-06-04 ENCOUNTER — APPOINTMENT (OUTPATIENT)
Dept: PSYCHIATRY | Facility: CLINIC | Age: 55
End: 2025-06-04
Payer: MEDICARE

## 2025-06-04 DIAGNOSIS — F32.A DEPRESSION, UNSPECIFIED: ICD-10-CM

## 2025-06-04 DIAGNOSIS — G47.00 INSOMNIA, UNSPECIFIED: ICD-10-CM

## 2025-06-04 DIAGNOSIS — F41.9 ANXIETY DISORDER, UNSPECIFIED: ICD-10-CM

## 2025-06-04 PROCEDURE — 90834 PSYTX W PT 45 MINUTES: CPT

## 2025-06-25 ENCOUNTER — APPOINTMENT (OUTPATIENT)
Dept: PSYCHIATRY | Facility: CLINIC | Age: 55
End: 2025-06-25

## 2025-07-18 ENCOUNTER — APPOINTMENT (OUTPATIENT)
Dept: PSYCHIATRY | Facility: CLINIC | Age: 55
End: 2025-07-18

## 2025-07-18 PROCEDURE — 90834 PSYTX W PT 45 MINUTES: CPT

## 2025-08-01 ENCOUNTER — APPOINTMENT (OUTPATIENT)
Dept: PSYCHIATRY | Facility: CLINIC | Age: 55
End: 2025-08-01
Payer: MEDICARE

## 2025-08-01 DIAGNOSIS — F32.A DEPRESSION, UNSPECIFIED: ICD-10-CM

## 2025-08-01 DIAGNOSIS — F41.9 ANXIETY DISORDER, UNSPECIFIED: ICD-10-CM

## 2025-08-01 PROCEDURE — 90834 PSYTX W PT 45 MINUTES: CPT

## 2025-08-13 ENCOUNTER — APPOINTMENT (OUTPATIENT)
Dept: PSYCHIATRY | Facility: CLINIC | Age: 55
End: 2025-08-13

## 2025-09-10 ENCOUNTER — APPOINTMENT (OUTPATIENT)
Dept: PSYCHIATRY | Facility: CLINIC | Age: 55
End: 2025-09-10
Payer: MEDICARE

## 2025-09-10 DIAGNOSIS — F41.9 ANXIETY DISORDER, UNSPECIFIED: ICD-10-CM

## 2025-09-10 DIAGNOSIS — F32.A DEPRESSION, UNSPECIFIED: ICD-10-CM

## 2025-09-10 PROCEDURE — 90834 PSYTX W PT 45 MINUTES: CPT
